# Patient Record
Sex: MALE | Race: WHITE | Employment: FULL TIME | URBAN - METROPOLITAN AREA
[De-identification: names, ages, dates, MRNs, and addresses within clinical notes are randomized per-mention and may not be internally consistent; named-entity substitution may affect disease eponyms.]

---

## 2020-08-20 ENCOUNTER — OFFICE VISIT (OUTPATIENT)
Dept: FAMILY MEDICINE CLINIC | Age: 40
End: 2020-08-20
Payer: COMMERCIAL

## 2020-08-20 VITALS
RESPIRATION RATE: 20 BRPM | SYSTOLIC BLOOD PRESSURE: 152 MMHG | TEMPERATURE: 97 F | WEIGHT: 312 LBS | BODY MASS INDEX: 38.79 KG/M2 | HEART RATE: 88 BPM | DIASTOLIC BLOOD PRESSURE: 86 MMHG | HEIGHT: 75 IN

## 2020-08-20 PROCEDURE — G8417 CALC BMI ABV UP PARAM F/U: HCPCS | Performed by: FAMILY MEDICINE

## 2020-08-20 PROCEDURE — 1036F TOBACCO NON-USER: CPT | Performed by: FAMILY MEDICINE

## 2020-08-20 PROCEDURE — G8427 DOCREV CUR MEDS BY ELIG CLIN: HCPCS | Performed by: FAMILY MEDICINE

## 2020-08-20 PROCEDURE — 99204 OFFICE O/P NEW MOD 45 MIN: CPT | Performed by: FAMILY MEDICINE

## 2020-08-20 PROCEDURE — 93000 ELECTROCARDIOGRAM COMPLETE: CPT | Performed by: FAMILY MEDICINE

## 2020-08-20 ASSESSMENT — PATIENT HEALTH QUESTIONNAIRE - PHQ9
2. FEELING DOWN, DEPRESSED OR HOPELESS: 0
1. LITTLE INTEREST OR PLEASURE IN DOING THINGS: 0
SUM OF ALL RESPONSES TO PHQ QUESTIONS 1-9: 0
SUM OF ALL RESPONSES TO PHQ QUESTIONS 1-9: 0
SUM OF ALL RESPONSES TO PHQ9 QUESTIONS 1 & 2: 0

## 2020-08-20 ASSESSMENT — ENCOUNTER SYMPTOMS
EYES NEGATIVE: 1
BLOOD IN STOOL: 0
CHEST TIGHTNESS: 0
SHORTNESS OF BREATH: 1
ABDOMINAL PAIN: 0

## 2020-08-20 NOTE — PROGRESS NOTES
Chief Complaint   Patient presents with    New Patient     Here today as a new patient to get established, previous PCP Dr. John French. C/O chest pain with SOB on exertion x 2 months.  Leg Problem     C/O discoloration in both lower extremities. SUBJECTIVE     Vadim Valiente is a 47 y.o.male      Pt presents to establish care. Previous PCP was Dr. John French in [address] [place] [address]  Hasn't been seen in a number of years. He is a . C/o shortness of breath and substernal chest pain with exertion over the last 2 months. Chest pain is intermittent and nonradiating. Strong fam hx of CAD and CHF. Former smoker. Overweight/obese. He has noted some discoloration of his lower legs over the last year--mostly a brown color. Here with his girlfriend. Body mass index is 39 kg/m². History reviewed. No pertinent past medical history. Past Surgical History:   Procedure Laterality Date    ANKLE FRACTURE SURGERY Left Age 25       No Known Allergies     No current outpatient medications on file. Family History   Problem Relation Age of Onset    High Blood Pressure Father     Diabetes Father     Other Father         CHF    Stroke Father     Heart Disease Paternal Grandfather     Anxiety Disorder Mother     No Known Problems Brother          Social History     Tobacco Use    Smoking status: Former Smoker    Smokeless tobacco: Never Used   Substance Use Topics    Alcohol use: Yes     Alcohol/week: 0.0 standard drinks     Comment: socially    Drug use: Never         Review of Systems   Constitutional: Negative for chills, fatigue, fever and unexpected weight change. HENT: Negative. Eyes: Negative. Respiratory: Positive for shortness of breath. Negative for chest tightness. Cardiovascular: Positive for chest pain and leg swelling. Negative for palpitations. Gastrointestinal: Negative for abdominal pain and blood in stool. Genitourinary: Negative for dysuria.    Musculoskeletal: Negative for joint swelling. Skin: Negative for rash. Neurological: Negative for dizziness and headaches. Psychiatric/Behavioral: Negative. All other systems reviewed and are negative. OBJECTIVE     BP (!) 152/86 (Site: Right Upper Arm)   Pulse 88   Temp 97 °F (36.1 °C) (Temporal)   Resp 20   Ht 6' 3\" (1.905 m)   Wt (!) 312 lb (141.5 kg)   BMI 39.00 kg/m²     Physical Exam  Vitals signs reviewed. Constitutional:       General: He is not in acute distress. Appearance: He is well-developed. HENT:      Head: Normocephalic and atraumatic. Right Ear: Tympanic membrane normal.      Left Ear: Tympanic membrane normal.      Mouth/Throat:      Mouth: Mucous membranes are moist.      Pharynx: No posterior oropharyngeal erythema. Eyes:      Conjunctiva/sclera: Conjunctivae normal.   Neck:      Musculoskeletal: Neck supple. Thyroid: No thyromegaly. Vascular: No carotid bruit. Cardiovascular:      Rate and Rhythm: Normal rate and regular rhythm. Heart sounds: No murmur. Pulmonary:      Effort: Pulmonary effort is normal.      Breath sounds: Normal breath sounds. No wheezing. Abdominal:      General: Bowel sounds are normal.      Palpations: Abdomen is soft. Tenderness: There is no abdominal tenderness. Musculoskeletal:      Right lower leg: No edema. Left lower leg: No edema. Lymphadenopathy:      Cervical: No cervical adenopathy. Skin:     General: Skin is warm and dry. Findings: Rash (changes of stasis dermatitis noted on the ankles bilaterally) present. Neurological:      Mental Status: He is alert and oriented to person, place, and time. Psychiatric:         Behavior: Behavior normal.                 There is no immunization history on file for this patient.       Health Maintenance   Topic Date Due    Varicella vaccine (1 of 2 - 2-dose childhood series) 03/23/1983    HIV screen  03/23/1997    DTaP/Tdap/Td vaccine (1 - Tdap) 03/23/2001    Flu vaccine (1) 09/01/2020    Hepatitis A vaccine  Aged Out    Hepatitis B vaccine  Aged Out    Hib vaccine  Aged Out    Meningococcal (ACWY) vaccine  Aged Out    Pneumococcal 0-64 years Vaccine  Aged Out         ASSESSMENT      1. Chest pain, unspecified type    2. Shortness of breath    3. Elevated blood-pressure reading without diagnosis of hypertension    4. Abnormal EKG            PLAN       Proceed with workup as below    If chest pain worsens or returns, urgent eval in the ED suggested    Orders Placed This Encounter   Procedures    CBC Auto Differential     Standing Status:   Future     Standing Expiration Date:   8/21/2021    Comprehensive Metabolic Panel     Standing Status:   Future     Standing Expiration Date:   8/20/2021    TSH with Reflex     Standing Status:   Future     Standing Expiration Date:   8/21/2021    Lipid Panel     Standing Status:   Future     Standing Expiration Date:   8/20/2021     Order Specific Question:   Is Patient Fasting?/# of Hours     Answer:   15    Stress test, myoview     Standing Status:   Future     Standing Expiration Date:   8/20/2021     Scheduling Instructions:      [facility]. First available. Order Specific Question:   Reason for Exam?     Answer:   Chest pain     Order Specific Question:   Reason for Exam?     Answer:   EKG abnormalities     Order Specific Question:   Reason for Exam?     Answer:   Shortness of breath    EKG 12 Lead     Order Specific Question:   Reason for Exam?     Answer:   Chest pain     [facility] received counseling on the following healthy behaviors: exercise    I have instructed [facility] to complete a self tracking handout on Blood Pressures  and instructed them to bring it with them to his next appointment. Discussed use, benefit, and side effects of prescribed medications. Barriers to medication compliance addressed. All patient questions answered. Pt voiced understanding.               Electronically signed by Chris Ambrocio Keely Faulkner MD on 8/20/2020 at 10:39 AM

## 2020-08-20 NOTE — PROGRESS NOTES
Pt is scheduled for a Cardiolite Stress Test at [facility] on 8/31/20 at 2:30 PM. Pt is to arrive at 2:00 PM.     I called [employer] ([phone]) and spoke to Kathy Candelaria and received a PA approval for the stress test (CPT 13271). Approval # [id number] and is valid from 8/20/20 to 10/4/20.

## 2020-08-29 ENCOUNTER — HOSPITAL ENCOUNTER (OUTPATIENT)
Age: 40
Discharge: HOME | End: 2020-08-29
Payer: COMMERCIAL

## 2020-08-29 DIAGNOSIS — R03.0 ELEVATED BLOOD-PRESSURE READING WITHOUT DIAGNOSIS OF HYPERTENSION: ICD-10-CM

## 2020-08-29 DIAGNOSIS — R06.02 SHORTNESS OF BREATH: ICD-10-CM

## 2020-08-29 DIAGNOSIS — R07.9 CHEST PAIN, UNSPECIFIED TYPE: ICD-10-CM

## 2020-08-29 LAB
ALBUMIN SERPL-MCNC: 4.2 G/DL (ref 3.5–5.1)
ALP BLD-CCNC: 92 U/L (ref 38–126)
ALT SERPL-CCNC: 64 U/L (ref 11–66)
ANION GAP SERPL CALCULATED.3IONS-SCNC: 11 MEQ/L (ref 8–16)
AST SERPL-CCNC: 31 U/L (ref 5–40)
BASOPHILS # BLD: 1.1 %
BASOPHILS ABSOLUTE: 0.1 THOU/MM3 (ref 0–0.1)
BILIRUB SERPL-MCNC: 0.6 MG/DL (ref 0.3–1.2)
BUN BLDV-MCNC: 10 MG/DL (ref 7–22)
CALCIUM SERPL-MCNC: 9.4 MG/DL (ref 8.5–10.5)
CHLORIDE BLD-SCNC: 105 MEQ/L (ref 98–111)
CHOLESTEROL, TOTAL: 274 MG/DL (ref 100–199)
CO2: 23 MEQ/L (ref 23–33)
CREAT SERPL-MCNC: 0.9 MG/DL (ref 0.4–1.2)
EOSINOPHIL # BLD: 3.4 %
EOSINOPHILS ABSOLUTE: 0.2 THOU/MM3 (ref 0–0.4)
ERYTHROCYTE [DISTWIDTH] IN BLOOD BY AUTOMATED COUNT: 13 % (ref 11.5–14.5)
ERYTHROCYTE [DISTWIDTH] IN BLOOD BY AUTOMATED COUNT: 40.3 FL (ref 35–45)
GFR SERPL CREATININE-BSD FRML MDRD: > 90 ML/MIN/1.73M2
GLUCOSE BLD-MCNC: 127 MG/DL (ref 70–108)
HCT VFR BLD CALC: 47.9 % (ref 42–52)
HDLC SERPL-MCNC: 35 MG/DL
HEMOGLOBIN: 15.7 GM/DL (ref 14–18)
IMMATURE GRANS (ABS): 0.02 THOU/MM3 (ref 0–0.07)
IMMATURE GRANULOCYTES: 0.4 %
LDL CHOLESTEROL CALCULATED: 198 MG/DL
LYMPHOCYTES # BLD: 24.2 %
LYMPHOCYTES ABSOLUTE: 1.1 THOU/MM3 (ref 1–4.8)
MCH RBC QN AUTO: 28.4 PG (ref 26–33)
MCHC RBC AUTO-ENTMCNC: 32.8 GM/DL (ref 32.2–35.5)
MCV RBC AUTO: 86.6 FL (ref 80–94)
MONOCYTES # BLD: 7.4 %
MONOCYTES ABSOLUTE: 0.3 THOU/MM3 (ref 0.4–1.3)
NUCLEATED RED BLOOD CELLS: 0 /100 WBC
PLATELET # BLD: 237 THOU/MM3 (ref 130–400)
PMV BLD AUTO: 9.9 FL (ref 9.4–12.4)
POTASSIUM SERPL-SCNC: 4.2 MEQ/L (ref 3.5–5.2)
RBC # BLD: 5.53 MILL/MM3 (ref 4.7–6.1)
SEG NEUTROPHILS: 63.5 %
SEGMENTED NEUTROPHILS ABSOLUTE COUNT: 3 THOU/MM3 (ref 1.8–7.7)
SODIUM BLD-SCNC: 139 MEQ/L (ref 135–145)
TOTAL PROTEIN: 6.7 G/DL (ref 6.1–8)
TRIGL SERPL-MCNC: 203 MG/DL (ref 0–199)
TSH SERPL DL<=0.05 MIU/L-ACNC: 1.05 UIU/ML (ref 0.4–4.2)
WBC # BLD: 4.7 THOU/MM3 (ref 4.8–10.8)

## 2020-08-29 PROCEDURE — 80061 LIPID PANEL: CPT

## 2020-08-29 PROCEDURE — 85025 COMPLETE CBC W/AUTO DIFF WBC: CPT

## 2020-08-29 PROCEDURE — 83036 HEMOGLOBIN GLYCOSYLATED A1C: CPT

## 2020-08-29 PROCEDURE — 80053 COMPREHEN METABOLIC PANEL: CPT

## 2020-08-29 PROCEDURE — 84443 ASSAY THYROID STIM HORMONE: CPT

## 2020-08-29 PROCEDURE — 36415 COLL VENOUS BLD VENIPUNCTURE: CPT

## 2020-08-31 ENCOUNTER — HOSPITAL ENCOUNTER (OUTPATIENT)
Dept: NON INVASIVE DIAGNOSTICS | Age: 40
Discharge: HOME | End: 2020-08-31
Payer: COMMERCIAL

## 2020-08-31 VITALS — BODY MASS INDEX: 40.04 KG/M2 | HEIGHT: 74 IN | WEIGHT: 312 LBS

## 2020-08-31 LAB
LV EF: 25 %
LVEF MODALITY: NORMAL

## 2020-08-31 PROCEDURE — 93017 CV STRESS TEST TRACING ONLY: CPT | Performed by: NUCLEAR MEDICINE

## 2020-08-31 PROCEDURE — A9500 TC99M SESTAMIBI: HCPCS | Performed by: FAMILY MEDICINE

## 2020-08-31 PROCEDURE — 78452 HT MUSCLE IMAGE SPECT MULT: CPT

## 2020-08-31 PROCEDURE — 3430000000 HC RX DIAGNOSTIC RADIOPHARMACEUTICAL: Performed by: FAMILY MEDICINE

## 2020-08-31 RX ADMIN — Medication 9.8 MILLICURIE: at 14:18

## 2020-08-31 RX ADMIN — Medication 34 MILLICURIE: at 15:27

## 2020-09-01 ENCOUNTER — TELEPHONE (OUTPATIENT)
Dept: FAMILY MEDICINE CLINIC | Age: 40
End: 2020-09-01

## 2020-09-01 ENCOUNTER — TELEPHONE (OUTPATIENT)
Dept: CARDIOLOGY CLINIC | Age: 40
End: 2020-09-01

## 2020-09-01 LAB
AVERAGE GLUCOSE: 135 MG/DL (ref 70–126)
HBA1C MFR BLD: 6.5 % (ref 4.4–6.4)

## 2020-09-01 RX ORDER — ATORVASTATIN CALCIUM 40 MG/1
40 TABLET, FILM COATED ORAL DAILY
Qty: 30 TABLET | Refills: 1 | Status: SHIPPED | OUTPATIENT
Start: 2020-09-01 | End: 2020-12-21 | Stop reason: SDUPTHER

## 2020-09-01 NOTE — TELEPHONE ENCOUNTER
Spoke with Ollie Land at Dr. Johanne Hamilton office notified patient has abnormal stress test. Per Ollie Land okay to schedule patient for appointment. Call to patient, appt made.

## 2020-09-01 NOTE — TELEPHONE ENCOUNTER
Pt was already notified of abnormal stress and appt was made with Dr Lorrie Jones for 9/8/20. Heart specialist called with the abnormal result. Called pt, notified of lab results. He will start med  Goes to DDD - rx sent in  Lab order mailed  Encouraged pt to f/up with hear specialist as scheduled. Called STR lab, they were able to add on A1C, pt was notified if it was unable to be added on we would call him back        ----- Message from Guerline Underwood MD sent at 9/1/2020  8:16 AM EDT -----  Notify him that his stress test showed possible cardiomyopathy with low EF (weak heart muscle). Get echocardiogram and refer to cardiology. CG  Cholesterol markedly elevated at 274 with LDL of 198 and low HDL at 35.  Recommend starting atorvastatin 40mg qd #30/1.  Recheck direct LDL, AST in 6 weeks.  FBS elevated at 127.  Have lab add on HbA1C.  CG

## 2020-09-08 ENCOUNTER — OFFICE VISIT (OUTPATIENT)
Dept: CARDIOLOGY CLINIC | Age: 40
End: 2020-09-08
Payer: COMMERCIAL

## 2020-09-08 ENCOUNTER — OFFICE VISIT (OUTPATIENT)
Dept: FAMILY MEDICINE CLINIC | Age: 40
End: 2020-09-08
Payer: COMMERCIAL

## 2020-09-08 VITALS
BODY MASS INDEX: 40.11 KG/M2 | RESPIRATION RATE: 16 BRPM | WEIGHT: 312.4 LBS | SYSTOLIC BLOOD PRESSURE: 132 MMHG | HEART RATE: 72 BPM | TEMPERATURE: 96.4 F | DIASTOLIC BLOOD PRESSURE: 80 MMHG

## 2020-09-08 VITALS
WEIGHT: 310.4 LBS | BODY MASS INDEX: 39.83 KG/M2 | SYSTOLIC BLOOD PRESSURE: 162 MMHG | DIASTOLIC BLOOD PRESSURE: 103 MMHG | HEIGHT: 74 IN | HEART RATE: 80 BPM

## 2020-09-08 PROBLEM — E66.01 MORBID OBESITY WITH BMI OF 40.0-44.9, ADULT (HCC): Status: ACTIVE | Noted: 2020-09-08

## 2020-09-08 PROBLEM — R73.01 IMPAIRED FASTING GLUCOSE: Status: ACTIVE | Noted: 2020-09-08

## 2020-09-08 PROCEDURE — 99213 OFFICE O/P EST LOW 20 MIN: CPT | Performed by: FAMILY MEDICINE

## 2020-09-08 PROCEDURE — G8417 CALC BMI ABV UP PARAM F/U: HCPCS | Performed by: NUCLEAR MEDICINE

## 2020-09-08 PROCEDURE — 1036F TOBACCO NON-USER: CPT | Performed by: NUCLEAR MEDICINE

## 2020-09-08 PROCEDURE — 99204 OFFICE O/P NEW MOD 45 MIN: CPT | Performed by: NUCLEAR MEDICINE

## 2020-09-08 PROCEDURE — G8427 DOCREV CUR MEDS BY ELIG CLIN: HCPCS | Performed by: FAMILY MEDICINE

## 2020-09-08 PROCEDURE — 1036F TOBACCO NON-USER: CPT | Performed by: FAMILY MEDICINE

## 2020-09-08 PROCEDURE — G8417 CALC BMI ABV UP PARAM F/U: HCPCS | Performed by: FAMILY MEDICINE

## 2020-09-08 PROCEDURE — G8427 DOCREV CUR MEDS BY ELIG CLIN: HCPCS | Performed by: NUCLEAR MEDICINE

## 2020-09-08 RX ORDER — METOPROLOL SUCCINATE 50 MG/1
50 TABLET, EXTENDED RELEASE ORAL DAILY
Qty: 30 TABLET | Refills: 5 | Status: SHIPPED
Start: 2020-09-08 | End: 2020-09-28 | Stop reason: DRUGHIGH

## 2020-09-08 RX ORDER — LOSARTAN POTASSIUM 50 MG/1
50 TABLET ORAL DAILY
Qty: 30 TABLET | Refills: 5 | Status: SHIPPED | OUTPATIENT
Start: 2020-09-08 | End: 2020-09-25 | Stop reason: ALTCHOICE

## 2020-09-08 RX ORDER — LOSARTAN POTASSIUM 50 MG/1
50 TABLET ORAL DAILY
COMMUNITY
End: 2020-09-08 | Stop reason: SDUPTHER

## 2020-09-08 RX ORDER — METOPROLOL SUCCINATE 50 MG/1
50 TABLET, EXTENDED RELEASE ORAL DAILY
COMMUNITY
End: 2020-09-08 | Stop reason: SDUPTHER

## 2020-09-08 ASSESSMENT — ENCOUNTER SYMPTOMS
RECTAL PAIN: 0
SHORTNESS OF BREATH: 1
BLOOD IN STOOL: 0
ABDOMINAL DISTENTION: 0
CONSTIPATION: 0
PHOTOPHOBIA: 0
BACK PAIN: 0
SHORTNESS OF BREATH: 1
DIARRHEA: 0
GASTROINTESTINAL NEGATIVE: 1
ANAL BLEEDING: 0
ABDOMINAL PAIN: 0
COLOR CHANGE: 0
NAUSEA: 0
CHEST TIGHTNESS: 0
VOMITING: 0

## 2020-09-08 NOTE — PROGRESS NOTES
Chief Complaint   Patient presents with    Discuss Labs     Recent labs, results in Epic. Saw cardiology earlier today. Heart cath pending insurance. SUBJECTIVE     John Delgado is a 47 y.o.male      Pt here for follow up of recent labs. HbA1C was 6.5% indicating borderline diabetes. He is currently undergoing a cardiac workup after stress testing showed a low EF and possible cardiomyopathy. He is now on a beta blocker and and ARB. Recent labs also showed elevated cholesterol and lipitor was started. He is a  and eats one meal a day, usually supper. He admits to a sweet tooth and often eats candy while he's driving. He is not active with exercise other than mowing. Strong fam hx of heart disease and diabetes. Former smoker. Body mass index is 40.11 kg/m². Review of Systems   Constitutional: Negative for fatigue, fever and unexpected weight change. HENT: Negative. Respiratory: Positive for shortness of breath. Cardiovascular: Negative for leg swelling. Gastrointestinal: Negative. Genitourinary: Negative. Musculoskeletal: Negative. Neurological: Negative. All other systems reviewed and are negative. OBJECTIVE     /80   Pulse 72   Temp 96.4 °F (35.8 °C) (Temporal)   Resp 16   Wt (!) 312 lb 6.4 oz (141.7 kg)   BMI 40.11 kg/m²     Physical Exam  Constitutional:       General: He is not in acute distress. Appearance: He is well-developed. HENT:      Head: Normocephalic and atraumatic. Right Ear: Tympanic membrane normal.      Left Ear: Tympanic membrane normal.      Mouth/Throat:      Mouth: Mucous membranes are moist.      Pharynx: No posterior oropharyngeal erythema. Eyes:      Conjunctiva/sclera: Conjunctivae normal.   Cardiovascular:      Rate and Rhythm: Normal rate and regular rhythm. Heart sounds: No murmur. Pulmonary:      Breath sounds: Normal breath sounds. No wheezing.    Musculoskeletal:      Right lower leg: No edema. Left lower leg: No edema. Lymphadenopathy:      Cervical: No cervical adenopathy. Neurological:      Mental Status: He is alert.        Component      Latest Ref Rng & Units 8/29/2020   WBC      4.8 - 10.8 thou/mm3 4.7 (L)   RBC      4.70 - 6.10 mill/mm3 5.53   Hemoglobin Quant      14.0 - 18.0 gm/dl 15.7   Hematocrit      42.0 - 52.0 % 47.9   MCV      80.0 - 94.0 fL 86.6   MCH      26.0 - 33.0 pg 28.4   MCHC      32.2 - 35.5 gm/dl 32.8   RDW-CV      11.5 - 14.5 % 13.0   RDW-SD      35.0 - 45.0 fL 40.3   Platelet Count      222 - 400 thou/mm3 237   MPV      9.4 - 12.4 fL 9.9   Seg Neutrophils      % 63.5   Lymphocytes      % 24.2   Monocytes      % 7.4   Eosinophils      % 3.4   Basophils      % 1.1   Immature Granulocytes      % 0.4   Segs Absolute      1.8 - 7.7 thou/mm3 3.0   Lymphocytes Absolute      1.0 - 4.8 thou/mm3 1.1   Monocytes Absolute      0.4 - 1.3 thou/mm3 0.3 (L)   Eosinophils Absolute      0.0 - 0.4 thou/mm3 0.2   Basophils Absolute      0.0 - 0.1 thou/mm3 0.1   Immature Grans (Abs)      0.00 - 0.07 thou/mm3 0.02   Nucleated Red Blood Cells      /100 wbc 0   Glucose      70 - 108 mg/dL 127 (H)   Creatinine      0.4 - 1.2 mg/dL 0.9   BUN      7 - 22 mg/dL 10   Sodium      135 - 145 meq/L 139   Potassium      3.5 - 5.2 meq/L 4.2   Chloride      98 - 111 meq/L 105   CO2      23 - 33 meq/L 23   Calcium      8.5 - 10.5 mg/dL 9.4   AST      5 - 40 U/L 31   Alk Phos      38 - 126 U/L 92   Total Protein      6.1 - 8.0 g/dL 6.7   Albumin      3.5 - 5.1 g/dL 4.2   Bilirubin      0.3 - 1.2 mg/dL 0.6   ALT      11 - 66 U/L 64   Cholesterol, Total      100 - 199 mg/dL 274 (H)   Triglycerides      0 - 199 mg/dL 203 (H)   HDL Cholesterol      mg/dL 35   LDL Calculated      mg/dL 198   Hemoglobin A1C      4.4 - 6.4 % 6.5 (H)   AVERAGE GLUCOSE      70 - 126 mg/dL 135 (H)   TSH      0.400 - 4.200 uIU/mL 1.050   Anion Gap      8.0 - 16.0 meq/L 11.0   Est, Glom Filt Rate      ml/min/1.73m2 >90 ASSESSMENT      1. Impaired fasting glucose    2. Morbid obesity with BMI of 40.0-44.9, adult ([address]          PLAN     He opts to try lifestyle modifications to decrease his sugars. Information on an ADA diet given    Increase activity    Goal weight loss of 15# by next visit    Return in about 3 months (around 12/8/2020).     HbA1C at the visit            Electronically signed by Rekha Richmnod MD on 9/8/2020 at 3:30 PM

## 2020-09-08 NOTE — PROGRESS NOTES
NP here to review abn stress test    Pt denies dizziness    Pt c/o cp- does not radiate, sob, JENNIFER and palpitations

## 2020-09-08 NOTE — PROGRESS NOTES
Topic Date Due    Varicella vaccine (1 of 2 - 2-dose childhood series) 03/23/1983    Diabetic foot exam  03/23/1992    Diabetic retinal exam  03/23/1992    HIV screen  03/23/1997    Diabetic microalbuminuria test  03/23/2000    DTaP/Tdap/Td vaccine (1 - Tdap) 03/23/2001    Flu vaccine (1) 09/01/2020    A1C test (Diabetic or Prediabetic)  08/29/2021    Lipid screen  08/29/2021    Potassium monitoring  08/29/2021    Creatinine monitoring  08/29/2021    Hepatitis A vaccine  Aged Out    Hepatitis B vaccine  Aged Out    Hib vaccine  Aged Out    Meningococcal (ACWY) vaccine  Aged Out    Pneumococcal 0-64 years Vaccine  Aged Out       Subjective:  Review of Systems   Constitutional: Positive for fatigue. HENT: Negative for ear pain and nosebleeds. Eyes: Negative for photophobia. Respiratory: Positive for shortness of breath. Negative for chest tightness. Cardiovascular: Negative for chest pain and palpitations. Gastrointestinal: Negative for abdominal distention, abdominal pain, anal bleeding, blood in stool, constipation, diarrhea, nausea, rectal pain and vomiting. Endocrine: Negative for polyphagia. Genitourinary: Negative for dysuria, frequency and urgency. Musculoskeletal: Negative for arthralgias, back pain, gait problem, joint swelling, myalgias, neck pain and neck stiffness. Skin: Negative for color change, pallor, rash and wound. Allergic/Immunologic: Negative for food allergies. Neurological: Negative for dizziness, syncope and light-headedness. Psychiatric/Behavioral: Negative for confusion, decreased concentration, dysphoric mood, hallucinations and self-injury. The patient is not nervous/anxious and is not hyperactive. Objective:  Physical Exam  HENT:      Head: Normocephalic and atraumatic. Nose: Nose normal.   Eyes:      Extraocular Movements: Extraocular movements intact. Pupils: Pupils are equal, round, and reactive to light.    Neck: Musculoskeletal: Normal range of motion. Cardiovascular:      Rate and Rhythm: Normal rate and regular rhythm. Heart sounds: Murmur present. No friction rub. No gallop. Pulmonary:      Effort: No respiratory distress. Breath sounds: No stridor. No wheezing, rhonchi or rales. Chest:      Chest wall: No tenderness. Abdominal:      General: There is no distension. Palpations: There is no mass. Tenderness: There is no abdominal tenderness. There is no right CVA tenderness, left CVA tenderness, guarding or rebound. Hernia: No hernia is present. Musculoskeletal:         General: No swelling, tenderness, deformity or signs of injury. Right lower leg: No edema. Left lower leg: No edema. Skin:     Coloration: Skin is not jaundiced or pale. Findings: No bruising, erythema, lesion or rash. Neurological:      Mental Status: He is alert and oriented to person, place, and time. Cranial Nerves: No cranial nerve deficit. Sensory: No sensory deficit. Motor: No weakness. Coordination: Coordination normal.      Gait: Gait normal.      Deep Tendon Reflexes: Reflexes normal.   Psychiatric:         Mood and Affect: Mood normal.         Thought Content: Thought content normal.       BP (!) 162/103   Pulse 80   Ht 6' 2\" (1.88 m)   Wt (!) 310 lb 6.4 oz (140.8 kg)   BMI 39.85 kg/m²     Assessment:      Diagnosis Orders   1. Precordial pain  Echo 2D w doppler w color complete    XR Cardiac Cath Procedure   2. Dilated cardiomyopathy ([address]  Echo 2D w doppler w color complete    XR Cardiac Cath Procedure   3. Abnormal stress test  Echo 2D w doppler w color complete    XR Cardiac Cath Procedure   4. Familial hypercholesterolemia  Echo 2D w doppler w color complete    XR Cardiac Cath Procedure   5.  Essential hypertension  Echo 2D w doppler w color complete    XR Cardiac Cath Procedure   multiple risk factors for CAD  Uncontrolled HTN  Poor EF   Multiple signs and symptoms suggestive of sleep apnea, will consider sleep study after cardiac evaluation is completed      Plan:  No follow-ups on file. Discussed  Echo   Cardiac cathterizaion, risks and benefits discussed with the patient and family at length. Patient was agreeable. Add beta blockers and ARB  Continue risk factor modification and medical management  Thank you for allowing me to participate in the care of your patient. Please don't hesitate to contact me regarding any further issues related to the patient care    Orders Placed:  Orders Placed This Encounter   Procedures    XR Cardiac Cath Procedure     Standing Status:   Future     Standing Expiration Date:   9/8/2021     Order Specific Question:   Reason for exam:     Answer:   other    Echo 2D w doppler w color complete     Standing Status:   Future     Standing Expiration Date:   9/8/2021     Order Specific Question:   Reason for exam:     Answer:   other       Medications Prescribed:  No orders of the defined types were placed in this encounter. Discussed use, benefit, and side effects of prescribed medications. All patient questions answered. Pt voicedunderstanding. Instructed to continue current medications, diet and exercise. Continue risk factor modification and medical management. Patient agreed with treatment plan. Follow up as directed.     Electronically signedby Mary Jo Sanchez MD on 9/8/2020 at 9:39 AM

## 2020-09-18 ENCOUNTER — PREP FOR PROCEDURE (OUTPATIENT)
Dept: CARDIOLOGY | Age: 40
End: 2020-09-18

## 2020-09-18 RX ORDER — ASPIRIN 325 MG
325 TABLET ORAL ONCE
Status: CANCELLED | OUTPATIENT
Start: 2020-09-18 | End: 2020-09-18

## 2020-09-18 RX ORDER — DIPHENHYDRAMINE HYDROCHLORIDE 50 MG/ML
50 INJECTION INTRAMUSCULAR; INTRAVENOUS ONCE
Status: CANCELLED | OUTPATIENT
Start: 2020-09-18 | End: 2020-09-18

## 2020-09-18 RX ORDER — SODIUM CHLORIDE 0.9 % (FLUSH) 0.9 %
10 SYRINGE (ML) INJECTION EVERY 12 HOURS SCHEDULED
Status: CANCELLED | OUTPATIENT
Start: 2020-09-18

## 2020-09-18 RX ORDER — NITROGLYCERIN 0.4 MG/1
0.4 TABLET SUBLINGUAL EVERY 5 MIN PRN
Status: CANCELLED | OUTPATIENT
Start: 2020-09-18

## 2020-09-18 RX ORDER — SODIUM CHLORIDE 0.9 % (FLUSH) 0.9 %
10 SYRINGE (ML) INJECTION PRN
Status: CANCELLED | OUTPATIENT
Start: 2020-09-18

## 2020-09-18 RX ORDER — SODIUM CHLORIDE 9 MG/ML
INJECTION, SOLUTION INTRAVENOUS CONTINUOUS
Status: CANCELLED | OUTPATIENT
Start: 2020-09-18

## 2020-09-21 ENCOUNTER — HOSPITAL ENCOUNTER (OUTPATIENT)
Dept: INPATIENT UNIT | Age: 40
Discharge: HOME | End: 2020-09-21
Attending: NUCLEAR MEDICINE | Admitting: NUCLEAR MEDICINE
Payer: COMMERCIAL

## 2020-09-21 VITALS
WEIGHT: 312 LBS | SYSTOLIC BLOOD PRESSURE: 117 MMHG | TEMPERATURE: 97.5 F | RESPIRATION RATE: 19 BRPM | HEIGHT: 74 IN | BODY MASS INDEX: 40.04 KG/M2 | HEART RATE: 71 BPM | OXYGEN SATURATION: 99 % | DIASTOLIC BLOOD PRESSURE: 57 MMHG

## 2020-09-21 LAB
ABO: NORMAL
ANION GAP SERPL CALCULATED.3IONS-SCNC: 11 MEQ/L (ref 8–16)
ANTIBODY SCREEN: NORMAL
APTT: 28.7 SECONDS (ref 22–38)
BUN BLDV-MCNC: 14 MG/DL (ref 7–22)
CALCIUM SERPL-MCNC: 9.4 MG/DL (ref 8.5–10.5)
CHLORIDE BLD-SCNC: 107 MEQ/L (ref 98–111)
CHOLESTEROL, TOTAL: 194 MG/DL (ref 100–199)
CO2: 23 MEQ/L (ref 23–33)
CREAT SERPL-MCNC: 1 MG/DL (ref 0.4–1.2)
EKG ATRIAL RATE: 68 BPM
EKG P-R INTERVAL: 150 MS
EKG Q-T INTERVAL: 376 MS
EKG QRS DURATION: 84 MS
EKG QTC CALCULATION (BAZETT): 399 MS
EKG R AXIS: -21 DEGREES
EKG T AXIS: 144 DEGREES
EKG VENTRICULAR RATE: 68 BPM
ERYTHROCYTE [DISTWIDTH] IN BLOOD BY AUTOMATED COUNT: 13.1 % (ref 11.5–14.5)
ERYTHROCYTE [DISTWIDTH] IN BLOOD BY AUTOMATED COUNT: 40.7 FL (ref 35–45)
GFR SERPL CREATININE-BSD FRML MDRD: 83 ML/MIN/1.73M2
GLUCOSE BLD-MCNC: 104 MG/DL (ref 70–108)
HCT VFR BLD CALC: 44.2 % (ref 42–52)
HDLC SERPL-MCNC: 37 MG/DL
HEMOGLOBIN: 14.5 GM/DL (ref 14–18)
INR BLD: 0.97 (ref 0.85–1.13)
LDL CHOLESTEROL CALCULATED: 130 MG/DL
LV EF: 48 %
LVEF MODALITY: NORMAL
MCH RBC QN AUTO: 28.4 PG (ref 26–33)
MCHC RBC AUTO-ENTMCNC: 32.8 GM/DL (ref 32.2–35.5)
MCV RBC AUTO: 86.5 FL (ref 80–94)
PLATELET # BLD: 211 THOU/MM3 (ref 130–400)
PMV BLD AUTO: 9.4 FL (ref 9.4–12.4)
POTASSIUM REFLEX MAGNESIUM: 4.4 MEQ/L (ref 3.5–5.2)
RBC # BLD: 5.11 MILL/MM3 (ref 4.7–6.1)
RH FACTOR: NORMAL
SODIUM BLD-SCNC: 141 MEQ/L (ref 135–145)
TRIGL SERPL-MCNC: 135 MG/DL (ref 0–199)
WBC # BLD: 4.7 THOU/MM3 (ref 4.8–10.8)

## 2020-09-21 PROCEDURE — 93010 ELECTROCARDIOGRAM REPORT: CPT | Performed by: INTERNAL MEDICINE

## 2020-09-21 PROCEDURE — 86900 BLOOD TYPING SEROLOGIC ABO: CPT

## 2020-09-21 PROCEDURE — 86901 BLOOD TYPING SEROLOGIC RH(D): CPT

## 2020-09-21 PROCEDURE — 2709999900 HC NON-CHARGEABLE SUPPLY

## 2020-09-21 PROCEDURE — 6360000004 HC RX CONTRAST MEDICATION: Performed by: NUCLEAR MEDICINE

## 2020-09-21 PROCEDURE — 2580000003 HC RX 258: Performed by: NURSE PRACTITIONER

## 2020-09-21 PROCEDURE — 93005 ELECTROCARDIOGRAM TRACING: CPT | Performed by: NURSE PRACTITIONER

## 2020-09-21 PROCEDURE — 2500000003 HC RX 250 WO HCPCS

## 2020-09-21 PROCEDURE — 80061 LIPID PANEL: CPT

## 2020-09-21 PROCEDURE — 85027 COMPLETE CBC AUTOMATED: CPT

## 2020-09-21 PROCEDURE — 86850 RBC ANTIBODY SCREEN: CPT

## 2020-09-21 PROCEDURE — 36415 COLL VENOUS BLD VENIPUNCTURE: CPT

## 2020-09-21 PROCEDURE — 93458 L HRT ARTERY/VENTRICLE ANGIO: CPT | Performed by: NUCLEAR MEDICINE

## 2020-09-21 PROCEDURE — C1894 INTRO/SHEATH, NON-LASER: HCPCS

## 2020-09-21 PROCEDURE — 80048 BASIC METABOLIC PNL TOTAL CA: CPT

## 2020-09-21 PROCEDURE — 6360000002 HC RX W HCPCS

## 2020-09-21 PROCEDURE — 85730 THROMBOPLASTIN TIME PARTIAL: CPT

## 2020-09-21 PROCEDURE — 93306 TTE W/DOPPLER COMPLETE: CPT

## 2020-09-21 PROCEDURE — 85610 PROTHROMBIN TIME: CPT

## 2020-09-21 PROCEDURE — C1769 GUIDE WIRE: HCPCS

## 2020-09-21 RX ORDER — SODIUM CHLORIDE 0.9 % (FLUSH) 0.9 %
10 SYRINGE (ML) INJECTION EVERY 12 HOURS SCHEDULED
Status: DISCONTINUED | OUTPATIENT
Start: 2020-09-21 | End: 2020-09-21 | Stop reason: HOSPADM

## 2020-09-21 RX ORDER — ATROPINE SULFATE 0.4 MG/ML
0.5 AMPUL (ML) INJECTION
Status: DISCONTINUED | OUTPATIENT
Start: 2020-09-21 | End: 2020-09-21 | Stop reason: HOSPADM

## 2020-09-21 RX ORDER — DIPHENHYDRAMINE HYDROCHLORIDE 50 MG/ML
50 INJECTION INTRAMUSCULAR; INTRAVENOUS ONCE
Status: DISCONTINUED | OUTPATIENT
Start: 2020-09-21 | End: 2020-09-21 | Stop reason: HOSPADM

## 2020-09-21 RX ORDER — SODIUM CHLORIDE 9 MG/ML
INJECTION, SOLUTION INTRAVENOUS CONTINUOUS
Status: DISCONTINUED | OUTPATIENT
Start: 2020-09-21 | End: 2020-09-21

## 2020-09-21 RX ORDER — SODIUM CHLORIDE 9 MG/ML
INJECTION, SOLUTION INTRAVENOUS CONTINUOUS
Status: DISCONTINUED | OUTPATIENT
Start: 2020-09-21 | End: 2020-09-21 | Stop reason: HOSPADM

## 2020-09-21 RX ORDER — SODIUM CHLORIDE 0.9 % (FLUSH) 0.9 %
10 SYRINGE (ML) INJECTION EVERY 12 HOURS SCHEDULED
Status: DISCONTINUED | OUTPATIENT
Start: 2020-09-21 | End: 2020-09-21

## 2020-09-21 RX ORDER — ASPIRIN 325 MG
325 TABLET ORAL ONCE
Status: DISCONTINUED | OUTPATIENT
Start: 2020-09-21 | End: 2020-09-21 | Stop reason: HOSPADM

## 2020-09-21 RX ORDER — NITROGLYCERIN 0.4 MG/1
0.4 TABLET SUBLINGUAL EVERY 5 MIN PRN
Status: DISCONTINUED | OUTPATIENT
Start: 2020-09-21 | End: 2020-09-21 | Stop reason: HOSPADM

## 2020-09-21 RX ORDER — SODIUM CHLORIDE 0.9 % (FLUSH) 0.9 %
10 SYRINGE (ML) INJECTION PRN
Status: DISCONTINUED | OUTPATIENT
Start: 2020-09-21 | End: 2020-09-21 | Stop reason: HOSPADM

## 2020-09-21 RX ORDER — ACETAMINOPHEN 325 MG/1
650 TABLET ORAL EVERY 4 HOURS PRN
Status: DISCONTINUED | OUTPATIENT
Start: 2020-09-21 | End: 2020-09-21 | Stop reason: HOSPADM

## 2020-09-21 RX ORDER — SODIUM CHLORIDE 0.9 % (FLUSH) 0.9 %
10 SYRINGE (ML) INJECTION PRN
Status: DISCONTINUED | OUTPATIENT
Start: 2020-09-21 | End: 2020-09-21

## 2020-09-21 RX ADMIN — IOPAMIDOL 80 ML: 755 INJECTION, SOLUTION INTRAVENOUS at 14:31

## 2020-09-21 RX ADMIN — SODIUM CHLORIDE: 9 INJECTION, SOLUTION INTRAVENOUS at 12:32

## 2020-09-21 NOTE — PROGRESS NOTES
Patient admitted to 2E12  Ambulatory for cardiac cath. Patient NPO. Patient accompanied by spouse. Vital signs obtained. Assessment and data collection intiated. Oriented to room. Policies and procedures for 2E explained. All questions answered with no further questions at this time. Fall prevention and safety precautions discussed with patient.

## 2020-09-21 NOTE — PROGRESS NOTES
Discharged home in stable condition.   Patient and sig other verbalize understanding of discharge instructions and follow-up

## 2020-09-21 NOTE — PROCEDURES
[address] [place] [address]                            CARDIAC CATHETERIZATION    PATIENT NAME: Bianca Escalante                     :        1980  MED REC NO:   [id number]                           ROOM:       0012  ACCOUNT NO:   [de-identified]                           ADMIT DATE: 2020  PROVIDER:     Selena Baldwin M.D.    DATE OF PROCEDURE:  2020    CLINICAL HISTORY AND INDICATION:  A 74-year-old patient with new-onset  cardiomyopathy, referred for cardiac catheterization to evaluate  coronary anatomy. PROCEDURES:  1. Left heart cath, LV-gram.  2.  Coronary angiogram, right and left. 3.  IV sedation; 2 of Versed, 25 of fentanyl between 02:00 and 02:30  p.m. in my presence under my supervision. 4.  Blood loss less than 10 mL. PROCEDURE DETAILS:  Please refer to my catheterization detailed note. HEMODYNAMIC RESULTS AND LEFT VENTRICULOGRAM:  Left ventricular  end-diastolic pressure was 12 mmHg. No significant change before and  after contrast injection. No significant gradient across the aortic  valve to signify aortic stenosis. Left ventricular function was  globally hypokinetic.  EF 35% to 40% with dilated ventricle. CORONARY ARTERIOGRAM RESULTS:  1. Left main is patent, gives rise to LAD and left circ. 2.  LAD has mild luminal irregularity, nonobstructive otherwise. 3.  Left circumflex artery is nondominant, has mild luminal  irregularity. 4.  Right coronary artery is large, dominant and patent. CONCLUSION:  1. Nonobstructive mild disease. 2.  Cardiomyopathy which is likely nonischemic. 3.  No complications. RECOMMENDATIONS:  At this point, the patient will be continued on  medical therapy. He apparently had some allergic reaction _____ short  term to see if that makes any difference and we will decide accordingly.         Randi Rutledge M.D.    D: 2020 17:00:15       T: 2020 17:03:57     [id number]  Job#: [id number]     Doc#: [id number]    CC:

## 2020-09-21 NOTE — H&P
[address] [address]  Sedation/Analgesia History & Physical    Pt Name: Stanton Diaz  Account number: [de-identified]  MRN: [id number]  YOB: 1980  Provider Performing Procedure: Alexandria Lopez MD [address] [facility]  Primary Care Physician: Rolan Watkins MD  Date: 9/21/2020    PRE-PROCEDURE    Code Status: FULL CODE  Brief History/Pre-Procedure Diagnosis:   CMP   Abn stress test   Dyspnea       Consent: : I have discussed with the patient risks, benefits, and alternatives (and relevant risks, benefits, and side effects related to alternatives or not receiving care), and likelihood of the success. The patient and/or representative appear to understand and agree to proceed. The discussion encompasses risks, benefits, and side effects related to the alternatives and the risks related to not receiving the proposed care, treatment, and services. MEDICAL HISTORY  [x]ASHD/ANGINA/MI/CHF   [x]Hypertension  []Diabetes  []Hyperlipidemia  []Smoking  []Family Hx of ASHD  []Additional information:       has a past medical history of Hyperlipidemia and Hypertension. SURGICAL HISTORY   has a past surgical history that includes Ankle fracture surgery (Left, Age 25).   Additional information:       ALLERGIES   Allergies as of 09/21/2020    (No Known Allergies)     Additional information:       MEDICATIONS   Aspirin  [x] 81 mg  [] 325 mg  [] None  Antiplatelet drug therapy use last 5 days  []No []Yes  Coumadin Use Last 5 Days []No []Yes  Other anticoagulant use last 5 days  []No []Yes    Current Facility-Administered Medications:     0.9 % sodium chloride infusion, , Intravenous, Continuous, Cele Ream APRN - CNP, Last Rate: 75 mL/hr at 09/21/20 1232    aspirin tablet 325 mg, 325 mg, Oral, Once, Nickola Ream, APRN - CNP    diphenhydrAMINE (BENADRYL) injection 50 mg, 50 mg, Intravenous, Once, Harinderola Ream, APRN - CNP    famotidine (PEPCID) injection 20 mg, 20 mg, Intravenous, Once, Mavis LATHAM

## 2020-09-21 NOTE — PROGRESS NOTES
Returned to 2e12. Monitor attached showing SR. Vasc-band in place to right wrist.  Hemostasis maintained. No bleeding, swelling, or edema noted.   0.9nss infusing with approx 700 ml

## 2020-09-23 ENCOUNTER — TELEPHONE (OUTPATIENT)
Dept: FAMILY MEDICINE CLINIC | Age: 40
End: 2020-09-23

## 2020-09-25 ENCOUNTER — TELEPHONE (OUTPATIENT)
Dept: CARDIOLOGY CLINIC | Age: 40
End: 2020-09-25

## 2020-09-25 NOTE — TELEPHONE ENCOUNTER
Pt had recent cath 9/21/20. Pt was told to stop losartan due to feeling lightheaded and dizzy. Pt continues with feeling  lightheaded and dizzy while taking metoprolol  50mg daily. Dr. Chan Rodey advise?

## 2020-09-28 RX ORDER — METOPROLOL SUCCINATE 25 MG/1
50 TABLET, EXTENDED RELEASE ORAL DAILY
COMMUNITY
End: 2020-11-02 | Stop reason: SDUPTHER

## 2020-09-28 NOTE — TELEPHONE ENCOUNTER
Patient notified and voiced understanding. Patient declined a script for 25 mg tablet patient wants to cut his 50 mg tablet in half.

## 2020-11-02 ENCOUNTER — OFFICE VISIT (OUTPATIENT)
Dept: CARDIOLOGY CLINIC | Age: 40
End: 2020-11-02
Payer: COMMERCIAL

## 2020-11-02 VITALS
BODY MASS INDEX: 40.09 KG/M2 | DIASTOLIC BLOOD PRESSURE: 80 MMHG | SYSTOLIC BLOOD PRESSURE: 138 MMHG | HEART RATE: 84 BPM | HEIGHT: 74 IN | WEIGHT: 312.4 LBS

## 2020-11-02 PROCEDURE — G8417 CALC BMI ABV UP PARAM F/U: HCPCS | Performed by: NUCLEAR MEDICINE

## 2020-11-02 PROCEDURE — 99214 OFFICE O/P EST MOD 30 MIN: CPT | Performed by: NUCLEAR MEDICINE

## 2020-11-02 PROCEDURE — 1036F TOBACCO NON-USER: CPT | Performed by: NUCLEAR MEDICINE

## 2020-11-02 PROCEDURE — G8484 FLU IMMUNIZE NO ADMIN: HCPCS | Performed by: NUCLEAR MEDICINE

## 2020-11-02 PROCEDURE — G8427 DOCREV CUR MEDS BY ELIG CLIN: HCPCS | Performed by: NUCLEAR MEDICINE

## 2020-11-02 RX ORDER — ISOSORBIDE MONONITRATE 30 MG/1
30 TABLET, EXTENDED RELEASE ORAL DAILY
COMMUNITY
End: 2020-11-02 | Stop reason: SDUPTHER

## 2020-11-02 RX ORDER — METOPROLOL SUCCINATE 50 MG/1
50 TABLET, EXTENDED RELEASE ORAL DAILY
Qty: 30 TABLET | Refills: 5 | Status: SHIPPED | OUTPATIENT
Start: 2020-11-02 | End: 2021-03-04 | Stop reason: SDUPTHER

## 2020-11-02 RX ORDER — ISOSORBIDE MONONITRATE 30 MG/1
30 TABLET, EXTENDED RELEASE ORAL DAILY
Qty: 30 TABLET | Refills: 5 | Status: SHIPPED | OUTPATIENT
Start: 2020-11-02 | End: 2021-06-11

## 2020-11-02 NOTE — PROGRESS NOTES
[address] [facility],[place] [address]  Dept: [phone]  Dept Fax: [phone]  Loc: [phone]    Visit Date: 2020    Kamron Baez is a 47 y.o. male who presents todayfor:  Chief Complaint   Patient presents with    Follow-Up from Hospital     cath    Chest Pain    Cardiomyopathy    Hyperlipidemia   still with chest pain   Progressive during the day   Cath done  Mild CAD  Did have CMP   Didn't tolerate losartan   Had itching   On beta blockers  No changes in breathing  On statins for hyperlipidemia  No tolerance issues  Likely sleep apnea  Not tested before  Severe obesity           HPI:  HPI  Past Medical History:   Diagnosis Date    Hyperlipidemia     Hypertension       Past Surgical History:   Procedure Laterality Date    ANKLE FRACTURE SURGERY Left Age 25     Family History   Problem Relation Age of Onset    High Blood Pressure Father    [de-identified] Diabetes Father     Other Father         CHF    Stroke Father     Heart Disease Paternal Grandfather     Anxiety Disorder Mother     No Known Problems Brother      Social History     Tobacco Use    Smoking status: Former Smoker     Last attempt to quit: 2000     Years since quittin.8    Smokeless tobacco: Never Used   Substance Use Topics    Alcohol use: Yes     Alcohol/week: 0.0 standard drinks     Comment: socially      Current Outpatient Medications   Medication Sig Dispense Refill    metoprolol succinate (TOPROL XL) 25 MG extended release tablet Take 25 mg by mouth daily      atorvastatin (LIPITOR) 40 MG tablet Take 1 tablet by mouth daily 30 tablet 1     No current facility-administered medications for this visit.       No Known Allergies  Health Maintenance   Topic Date Due    Varicella vaccine (1 of 2 - 2-dose childhood series) 1983    Pneumococcal 0-64 years Vaccine (1 of 1 - PPSV23) 1988    Diabetic foot exam  1992    Diabetic retinal exam  03/23/1992    HIV screen  03/23/1997    Diabetic microalbuminuria test  03/23/2000    DTaP/Tdap/Td vaccine (1 - Tdap) 03/23/2001    Flu vaccine (1) 09/01/2020    A1C test (Diabetic or Prediabetic)  08/29/2021    Lipid screen  09/21/2021    Hepatitis A vaccine  Aged Out    Hepatitis B vaccine  Aged Out    Hib vaccine  Aged Out    Meningococcal (ACWY) vaccine  Aged Out       Subjective:  Review of Systems  General:   No fever, no chills, some fatigue or weight loss  Pulmonary:    some dyspnea, no wheezing  Cardiac:    Did have recent chest pain,   GI:     No nausea or vomiting, no abdominal pain  Neuro:     No dizziness or light headedness,   Musculoskeletal:  No recent active issues  Extremities:   No edema, no obvious claudication     Objective:  Physical Exam  /80   Pulse 84   Ht 6' 2\" (1.88 m)   Wt (!) 312 lb 6.4 oz (141.7 kg)   BMI 40.11 kg/m²   General:   Well developed, well nourished  Lungs:    Clear to auscultation  Heart:    Normal S1 S2, Slight murmur. no rubs, no gallops  Abdomen:   Soft, non tender, no organomegalies, positive bowel sounds  Extremities:   No edema, no cyanosis, good peripheral pulses  Neurological:   Awake, alert, oriented. No obvious focal deficits  Musculoskelatal:  No obvious deformities    Assessment:      Diagnosis Orders   1. Shortness of breath     2. Familial hypercholesterolemia     3. Dilated cardiomyopathy ([facility] [address]     as above  Likely sleep apnea  CMP   tolerance issues   Obesity: extensive discussion was made with the patient regarding diet and weight control including specific food items to avoid and cut down    Plan:  No follow-ups on file. As above  Increase toprol 50 m g  Add nitrates  Sleep study   Continue risk factor modification and medical management  Thank you for allowing me to participate in the care of your patient.  Please don't hesitate to contact me regarding any further issues related to the patient care    Orders Placed:  No orders of the defined types were placed in this encounter. Medications Prescribed:  No orders of the defined types were placed in this encounter. Discussed use, benefit, and side effects of prescribed medications. All patient questions answered. Pt voicedunderstanding. Instructed to continue current medications, diet and exercise. Continue risk factor modification and medical management. Patient agreed with treatment plan. Follow up as directed.     Electronically signedby Mi Valle MD on 11/2/2020 at 10:01 AM

## 2020-12-07 ENCOUNTER — INITIAL CONSULT (OUTPATIENT)
Dept: PULMONOLOGY | Age: 40
End: 2020-12-07
Payer: COMMERCIAL

## 2020-12-07 VITALS
HEART RATE: 75 BPM | DIASTOLIC BLOOD PRESSURE: 102 MMHG | OXYGEN SATURATION: 98 % | WEIGHT: 315 LBS | HEIGHT: 74 IN | BODY MASS INDEX: 40.43 KG/M2 | TEMPERATURE: 97.9 F | SYSTOLIC BLOOD PRESSURE: 142 MMHG

## 2020-12-07 PROCEDURE — G8484 FLU IMMUNIZE NO ADMIN: HCPCS | Performed by: INTERNAL MEDICINE

## 2020-12-07 PROCEDURE — G8427 DOCREV CUR MEDS BY ELIG CLIN: HCPCS | Performed by: INTERNAL MEDICINE

## 2020-12-07 PROCEDURE — 1036F TOBACCO NON-USER: CPT | Performed by: INTERNAL MEDICINE

## 2020-12-07 PROCEDURE — G8417 CALC BMI ABV UP PARAM F/U: HCPCS | Performed by: INTERNAL MEDICINE

## 2020-12-07 PROCEDURE — 99203 OFFICE O/P NEW LOW 30 MIN: CPT | Performed by: INTERNAL MEDICINE
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
## 2020-12-07 NOTE — PATIENT INSTRUCTIONS
Patient Education        Learning About Low-Carbohydrate Diets  What is a low-carbohydrate diet? A low-carbohydrate (or \"low-carb\") diet limits foods and drinks that have carbohydrates. This includes grains, fruits, milk and yogurt, and starchy vegetables like potatoes, beans, and corn. It also avoids foods and drinks that have added sugar. Instead, low-carb diets include foods that are high in protein and fat. Why might you follow a low-carb diet? Low-carb diets may be used for a variety of reasons, such as for weight loss. People who have diabetes may use a low-carb diet to help manage their blood sugar levels. What should you do before you start the diet? Talk to your doctor before you try any diet. This is even more important if you have health problems like kidney disease, heart disease, or diabetes. Your doctor may suggest that you meet with a registered dietitian. A dietitian can help you make an eating plan that works for you. What foods do you eat on a low-carb diet? On a low-carb diet, you choose foods that are high in protein and fat. Examples of these are:  · Meat, poultry, and fish. · Eggs. · Nuts, such as walnuts, pecans, almonds, and peanuts. · Peanut butter and other nut butters. · Tofu. · Avocado. · Raissa Eans. · Non-starchy vegetables like broccoli, cauliflower, green beans, mushrooms, peppers, lettuce, and spinach. · Unsweetened non-dairy milks like almond milk and coconut milk. · Cheese, cottage cheese, and cream cheese. Current as of: August 22, 2019               Content Version: 12.6  © 5146-9923 [employer], [employer]. Care instructions adapted under license by [facility] ([facility]). If you have questions about a medical condition or this instruction, always ask your healthcare professional. [address] any warranty or liability for your use of this information. Patient Education        Learning About CPAP for Sleep Apnea  What is CPAP?      CPAP is a small machine that you use at home every night while you sleep. It increases air pressure in your throat to keep your airway open. When you have sleep apnea, this can help you sleep better so you feel much better. CPAP stands for \"continuous positive airway pressure. \"  The CPAP machine will have one of the following:  · A mask that covers your nose and mouth  · Prongs that fit into your nose  · A mask that covers your nose only, which is the most common type. This type is called NCPAP. The N stands for \"nasal.\"  Why is it done? CPAP is usually the best treatment for obstructive sleep apnea. It is the first treatment choice and the most widely used. CPAP:  · Helps you have more normal sleep, so you feel less sleepy and more alert during the daytime. · May help keep heart failure or other heart problems from getting worse. · May help lower your blood pressure. If you use CPAP, your bed partner may also sleep better. That's because you aren't snoring or restless. Your doctor may suggest CPAP if you have:  · Moderate to severe sleep apnea. · Sleep apnea and coronary artery disease (CAD). · Sleep apnea and heart failure. What are the side effects? Some people who use CPAP have:  · A dry or stuffy nose and a sore throat. · Irritated skin on the face. · Sore eyes. · Bloating. How can you care for yourself? If using CPAP is not comfortable, or if you have certain side effects, work with your doctor to fix them. Here are some things you can try:  · Be sure the mask or nasal prongs fit well. · See if your doctor can adjust the pressure of your CPAP. · If your nose is dry, try a humidifier. · If your nose is runny or stuffy, try decongestant medicine or a steroid nasal spray. Be safe with medicines. Read and follow all instructions on the label. Do not use the medicine longer than the label says. If these things don't help, you might try a different type of machine.  Some machines have air pressure that adjusts on its own. Others have air pressures that are different when you breathe in than when you breathe out. This may reduce discomfort caused by too much pressure in your nose. Where can you learn more? Go to https://Quest Onlinevlad.ebridge. org and sign in to your [employer] account. Enter S129 in the [employer] box to learn more about \"Learning About CPAP for Sleep Apnea. \"     If you do not have an account, please click on the \"Sign Up Now\" link. Current as of: February 24, 2020               Content Version: 12.6  © 6535-5272 [employer]. Care instructions adapted under license by [facility] ([facility]). If you have questions about a medical condition or this instruction, always ask your healthcare professional. [address] any warranty or liability for your use of this information. Patient Education        Sleep Apnea: Care Instructions  Your Care Instructions     Sleep apnea means that you frequently stop breathing for 10 seconds or longer during sleep. It can be mild to severe, based on the number of times an hour that you stop breathing or have slowed breathing. Blocked or narrowed airways in your nose, mouth, or throat can cause sleep apnea. Your airway can become blocked when your throat muscles and tongue relax during sleep. You can treat sleep apnea at home by making lifestyle changes. You also can use a CPAP breathing machine that keeps tissues in the throat from blocking your airway. Or your doctor may suggest that you use a breathing device while you sleep. It helps keep your airway open. This could be a device that you put in your mouth. In some cases, surgery may be needed to remove enlarged tissues in the throat. Follow-up care is a key part of your treatment and safety. Be sure to make and go to all appointments, and call your doctor if you are having problems.  It's also a good idea to know your test results and keep a list of the medicines you take. How can you care for yourself at home? · Lose weight, if needed. It may reduce the number of times you stop breathing or have slowed breathing. · Sleep on your side. It may stop mild apnea. If you tend to roll onto your back, sew a pocket in the back of your pajama top. Put a tennis ball into the pocket, and stitch the pocket shut. This will help keep you from sleeping on your back. · Avoid alcohol and medicines such as sleeping pills and sedatives before bed. · Do not smoke. Smoking can make sleep apnea worse. If you need help quitting, talk to your doctor about stop-smoking programs and medicines. These can increase your chances of quitting for good. · Prop up the head of your bed 4 to 6 inches by putting bricks under the legs of the bed. · Treat breathing problems, such as a stuffy nose, caused by a cold or allergies. · Try a continuous positive airway pressure (CPAP) breathing machine if your doctor recommends it. The machine keeps your airway open when you sleep. · If CPAP does not work for you, ask your doctor if you can try other breathing machines. A bilevel positive airway pressure machine uses one type of air pressure for breathing in and another type for breathing out. Another device raises or lowers air pressure as needed while you breathe. · Talk to your doctor if:  ? Your nose feels dry or bleeds when you use one of these machines. You may need to increase moisture in the air. A humidifier may help. ? Your nose is runny or stuffy from using a breathing machine. Decongestants or a corticosteroid nasal spray may help. ? You are sleepy during the day and it gets in the way of the normal things you do. Do not drive when you are drowsy. When should you call for help?   Watch closely for changes in your health, and be sure to contact your doctor if:    · You still have sleep apnea even though you have made lifestyle changes.     · You are thinking of trying a device such as CPAP.     · You are having problems using a CPAP or similar machine. Where can you learn more? Go to https://Appercodepepiceweb.Groove [email or web address] org and sign in to your Comply365 account. Enter B106 in the [facility] box to learn more about \"Sleep Apnea: Care Instructions. \"     If you do not have an account, please click on the \"Sign Up Now\" link. Current as of: February 24, 2020               Content Version: 12.6  © 2006-2020 Moments.me[employer]orporated. Care instructions adapted under license by [facility] ([facility]). If you have questions about a medical condition or this instruction, always ask your healthcare professional. [address] any warranty or liability for your use of this information.

## 2020-12-21 ENCOUNTER — OFFICE VISIT (OUTPATIENT)
Dept: FAMILY MEDICINE CLINIC | Age: 40
End: 2020-12-21
Payer: COMMERCIAL

## 2020-12-21 VITALS
WEIGHT: 315 LBS | TEMPERATURE: 97 F | SYSTOLIC BLOOD PRESSURE: 150 MMHG | DIASTOLIC BLOOD PRESSURE: 90 MMHG | HEART RATE: 83 BPM | OXYGEN SATURATION: 98 % | BODY MASS INDEX: 40.67 KG/M2

## 2020-12-21 LAB — HBA1C MFR BLD: 5.9 % (ref 4.3–5.7)

## 2020-12-21 PROCEDURE — 83036 HEMOGLOBIN GLYCOSYLATED A1C: CPT | Performed by: FAMILY MEDICINE

## 2020-12-21 PROCEDURE — G8417 CALC BMI ABV UP PARAM F/U: HCPCS | Performed by: FAMILY MEDICINE

## 2020-12-21 PROCEDURE — 1036F TOBACCO NON-USER: CPT | Performed by: FAMILY MEDICINE

## 2020-12-21 PROCEDURE — G8484 FLU IMMUNIZE NO ADMIN: HCPCS | Performed by: FAMILY MEDICINE

## 2020-12-21 PROCEDURE — G8427 DOCREV CUR MEDS BY ELIG CLIN: HCPCS | Performed by: FAMILY MEDICINE

## 2020-12-21 PROCEDURE — 99213 OFFICE O/P EST LOW 20 MIN: CPT | Performed by: FAMILY MEDICINE

## 2020-12-21 RX ORDER — ATORVASTATIN CALCIUM 40 MG/1
40 TABLET, FILM COATED ORAL DAILY
Qty: 30 TABLET | Refills: 11 | Status: SHIPPED | OUTPATIENT
Start: 2020-12-21 | End: 2022-01-20 | Stop reason: SDUPTHER

## 2020-12-21 ASSESSMENT — ENCOUNTER SYMPTOMS: RESPIRATORY NEGATIVE: 1

## 2020-12-21 NOTE — PATIENT INSTRUCTIONS
Patient Education        Prediabetes: Care Instructions  Overview     Prediabetes is a warning sign that you're at risk for getting type 2 diabetes. It means that your blood sugar is higher than it should be. But it's not high enough to be diabetes. The food you eat naturally turns into sugar. Your body uses the sugar for energy. Normally, an organ called the pancreas makes insulin. And insulin allows the sugar in your blood to get into your body's cells. But sometimes the body can't use insulin the right way. So the sugar stays in your blood instead. This is called insulin resistance. The buildup of sugar in your blood means you have prediabetes. The good news is that you may be able to prevent or delay diabetes. Making small lifestyle changes, like getting active and changing your eating habits, may help you get your blood sugar back to normal. You can work with your doctor to make a treatment plan. Follow-up care is a key part of your treatment and safety. Be sure to make and go to all appointments, and call your doctor if you are having problems. It's also a good idea to know your test results and keep a list of the medicines you take. How can you care for yourself at home? · Watch your weight. A healthy weight helps your body use insulin properly. · Limit the amount of calories, sweets, and unhealthy fat you eat. Ask your doctor if you should see a dietitian. A registered dietitian can help you create meal plans that fit your lifestyle. · Get at least 30 minutes of exercise on most days of the week. Exercise helps control your blood sugar. It also helps you maintain a healthy weight. Walking is a good choice. You also may want to do other activities, such as running, swimming, cycling, or playing tennis or team sports. · Do not smoke. Smoking can make prediabetes worse. If you need help quitting, talk to your doctor about stop-smoking programs and medicines. These can increase your chances of quitting for good. · If your doctor prescribed medicines, take them exactly as prescribed. Call your doctor if you think you are having a problem with your medicine. You will get more details on the specific medicines your doctor prescribes. When should you call for help? Watch closely for changes in your health, and be sure to contact your doctor if:    · You have any symptoms of diabetes. These may include:  ? Being thirsty more often. ? Urinating more. ? Being hungrier. ? Losing weight. ? Being very tired. ? Having blurry vision.     · You have a wound that will not heal.     · You have an infection that will not go away.     · You have problems with your blood pressure.     · You want more information about diabetes and how you can keep from getting it. Where can you learn more? Go to https://WhoochpeEnertiv.Expert Medical [email or web address] org and sign in to your FREECULTR account. Enter I222 in the [employer] box to learn more about \"Prediabetes: Care Instructions. \"     If you do not have an account, please click on the \"Sign Up Now\" link. Current as of: December 20, 2019               Content Version: 12.6  © 6404-6469 [employer]orporated. Care instructions adapted under license by [address] If you have questions about a medical condition or this instruction, always ask your healthcare professional. [place] [address] any warranty or liability for your use of this information.

## 2021-01-26 ENCOUNTER — HOSPITAL ENCOUNTER (OUTPATIENT)
Dept: SLEEP CENTER | Age: 41
Discharge: HOME | End: 2021-01-28
Payer: COMMERCIAL

## 2021-01-26 DIAGNOSIS — R06.83 SNORING: ICD-10-CM

## 2021-01-26 DIAGNOSIS — G47.10 HYPERSOMNIA: ICD-10-CM

## 2021-01-26 DIAGNOSIS — I42.0 DILATED CARDIOMYOPATHY (HCC): ICD-10-CM

## 2021-01-26 DIAGNOSIS — E66.01 OBESITY, MORBID, BMI 40.0-49.9 (HCC): ICD-10-CM

## 2021-01-26 PROCEDURE — 95806 SLEEP STUDY UNATT&RESP EFFT: CPT

## 2021-01-28 LAB — STATUS: NORMAL

## 2021-02-01 DIAGNOSIS — G47.33 OSA (OBSTRUCTIVE SLEEP APNEA): Primary | ICD-10-CM

## 2021-02-03 NOTE — PROGRESS NOTES
[address] [place] [address]                               SLEEP STUDY REPORT    PATIENT NAME: Haja Rascon                     :        1980  MED REC NO:   [id number]                           ROOM:  ACCOUNT NO:   [de-identified]                           ADMIT DATE: 2021  PROVIDER:     JESSICA Lira Javedkareem STUDY:  2021    HOME SLEEP STUDY REPORT    REFERRING PROVIDER:  Farrukh Goss MD    HISTORY OF PRESENT ILLNESS:  The patient is a 82-year-old gentleman with  history of cardiac disease, cardiac arrhythmias, morbid obesity;  complains of nonrestorative sleep, loud snoring. Weight 316 pounds,  height 74 inches, BMI 40.6. METHODS:  The patient underwent Type III Portable Monitoring Sleep Study  including the simultaneous recording of oral-nasal airflow, rib and  abdominal respiratory effort, pulse rate, oxygen saturation, left and  right leg movement, and body position. Scoring criteria is consistent  with the current published AASM standards for scoring of apneas and  hypopneas 4A. DETAILS OF THE STUDY:  The patient came by the sleep lab and picked up  his HST unit. He was given instructions how to set it up. The patient  returned the unit the next day. The information was successfully  downloaded and scored. Total recording time 434.8 minutes. Lights off  time 10:00 p.m., lights on time 05:14 a.m. RESPIRATORY SUMMARY:  10 apneas, 2 of them obstructive and 8 central,  166 obstructive hypopneas for an MICHAEL of 24.3, 172 of these events were  recorded during supine time and 4 during non-supine time. PULSE OXIMETRY SUMMARY:  Mean oxygen saturation 93.8%, lowest oxygen  saturation 78%, there were 6.8 minutes of oxygen saturation below 88%. BODY POSITION SUMMARY:  340 minutes supine time, 94 minutes non-supine  time, 2.6 minutes upright time. ECG SUMMARY:  Frequent PVCs with trigeminy. ASSESSMENT:  1.  Obstructive sleep apnea with an MICHAEL of 24.3. 2.  Multifocal PVCs with trigeminy. 3.  Nocturnal oxygen saturation associated to obstructive events. RECOMMENDATIONS:  Due to the severity of findings, PAP therapies are  warranted. The patient will be contacted, and if he is agreeable, we  will start him on APAP therapies with a pressure range of 10 to 20 cm of  water and refer him to DME provider for mask fitting procedure. Follow  up at the sleep clinic eight weeks after initiating PAP therapies to  review of download.         Yelitza Alfonso M.D.    D: 02/01/2021 23:12:49       T: 02/02/2021 7:28:03     [email or web address]  Job#: [id number]     Doc#: [id number]    CC:

## 2021-02-23 ENCOUNTER — OFFICE VISIT (OUTPATIENT)
Dept: PULMONOLOGY | Age: 41
End: 2021-02-23
Payer: COMMERCIAL

## 2021-02-23 VITALS
TEMPERATURE: 97.8 F | BODY MASS INDEX: 40.17 KG/M2 | WEIGHT: 313 LBS | DIASTOLIC BLOOD PRESSURE: 94 MMHG | HEART RATE: 102 BPM | OXYGEN SATURATION: 98 % | HEIGHT: 74 IN | SYSTOLIC BLOOD PRESSURE: 146 MMHG

## 2021-02-23 DIAGNOSIS — G47.10 HYPERSOMNIA: ICD-10-CM

## 2021-02-23 DIAGNOSIS — G47.33 OSA (OBSTRUCTIVE SLEEP APNEA): Primary | ICD-10-CM

## 2021-02-23 PROCEDURE — G8484 FLU IMMUNIZE NO ADMIN: HCPCS | Performed by: INTERNAL MEDICINE

## 2021-02-23 PROCEDURE — G8417 CALC BMI ABV UP PARAM F/U: HCPCS | Performed by: INTERNAL MEDICINE

## 2021-02-23 PROCEDURE — G8427 DOCREV CUR MEDS BY ELIG CLIN: HCPCS | Performed by: INTERNAL MEDICINE

## 2021-02-23 PROCEDURE — 99213 OFFICE O/P EST LOW 20 MIN: CPT | Performed by: INTERNAL MEDICINE

## 2021-02-23 PROCEDURE — 1036F TOBACCO NON-USER: CPT | Performed by: INTERNAL MEDICINE

## 2021-02-23 NOTE — PROGRESS NOTES
Sleep Medicine    Riki Alvarenga, 47 y.o.  [id number]    Nurses Notes   Russ Shoemaker is here for a HST follow up   Study Results    Initial Study Date -  21  AHI -  24.3    Total Events - 176  (Apneas  10  Hypopneas 166  Central  8)  LM w/Arousals -   Sleep Efficiency -  % (Total Sleep Time - 434.8 min)  Time with Sats below 88% - 6.8 min  Oxygen level - Room Air    ESS 6  SAQLI 86      INTERVAL HISTORY         Riki Alvarenga is a 47 y.o. old male who comes in to review the results of his recent sleep study, to answer questions and to explore options for treatment. Still c/o daytime somnolence, poor quality sleep, choking spells even during wakefulness. PMH  Past Medical History:   Diagnosis Date    Hyperlipidemia     Hypertension      Past Surgical History:   Procedure Laterality Date    ANKLE FRACTURE SURGERY Left Age 25     Social History     Tobacco Use    Smoking status: Former Smoker     Quit date:      Years since quittin.1    Smokeless tobacco: Never Used   Substance Use Topics    Alcohol use: Yes     Alcohol/week: 0.0 standard drinks     Comment: socially    Drug use: Never     Family History   Problem Relation Age of Onset    High Blood Pressure Father     Diabetes Father     Other Father         CHF    Stroke Father     Heart Disease Paternal Grandfather     Anxiety Disorder Mother     No Known Problems Brother        ALLERGIES  No Known Allergies    MEDS  Current Outpatient Medications   Medication Sig Dispense Refill    atorvastatin (LIPITOR) 40 MG tablet Take 1 tablet by mouth daily 30 tablet 11    metoprolol succinate (TOPROL XL) 50 MG extended release tablet Take 1 tablet by mouth daily 30 tablet 5    isosorbide mononitrate (IMDUR) 30 MG extended release tablet Take 1 tablet by mouth daily 30 tablet 5     No current facility-administered medications for this visit.         EXAM Vitals -  BP (!) 146/94 (Site: Left Upper Arm, Position: Sitting)   Pulse 102   Temp 97.8 °F (36.6 °C)   Ht 6' 2\" (1.88 m)   Wt (!) 313 lb (142 kg)   SpO2 98% Comment: on RA  BMI 40.19 kg/m²    Body mass index is 40.19 kg/m². Dentition - good  Constitutional - No distress. Patient is oriented to person, place, and time. Mouth/Throat - Oropharynx is clear and moist.   Neck - Neck supple. No JVD present. No tracheal deviation present. Psychiatric - Patient  has a normal mood and affect. PATIENT NAME: Jenna Alva                     :        1980  MED REC NO:   [id number]                           ROOM:  ACCOUNT NO:   [de-identified]                           ADMIT DATE: 2021  PROVIDER:     Mary Ellen Aguila M.D.     DATE OF STUDY:  2021     HOME SLEEP STUDY REPORT     REFERRING PROVIDER:  Charbel Servin MD     HISTORY OF PRESENT ILLNESS:  The patient is a 81-year-old gentleman with  history of cardiac disease, cardiac arrhythmias, morbid obesity;  complains of nonrestorative sleep, loud snoring. Weight 316 pounds,  height 74 inches, BMI 40.6.     METHODS:  The patient underwent Type III Portable Monitoring Sleep Study  including the simultaneous recording of oral-nasal airflow, rib and  abdominal respiratory effort, pulse rate, oxygen saturation, left and  right leg movement, and body position. Scoring criteria is consistent  with the current published AASM standards for scoring of apneas and  hypopneas 4A.     DETAILS OF THE STUDY:  The patient came by the sleep lab and picked up  his HST unit. He was given instructions how to set it up. The patient  returned the unit the next day. The information was successfully  downloaded and scored. Total recording time 434.8 minutes.   Lights off  time 10:00 p.m., lights on time 05:14 a.m.     RESPIRATORY SUMMARY:  10 apneas, 2 of them obstructive and 8 central,  166 obstructive hypopneas for an MICHAEL of 24.3, 172 of these events were recorded during supine time and 4 during non-supine time.     PULSE OXIMETRY SUMMARY:  Mean oxygen saturation 93.8%, lowest oxygen  saturation 78%, there were 6.8 minutes of oxygen saturation below 88%.     BODY POSITION SUMMARY:  340 minutes supine time, 94 minutes non-supine  time, 2.6 minutes upright time.     ECG SUMMARY:  Frequent PVCs with trigeminy.     ASSESSMENT:  1.  Obstructive sleep apnea with an MICHAEL of 24.3. 2.  Multifocal PVCs with trigeminy. 3.  Nocturnal oxygen saturation associated to obstructive events.     RECOMMENDATIONS:  Due to the severity of findings, PAP therapies are  warranted. The patient will be contacted, and if he is agreeable, we  will start him on APAP therapies with a pressure range of 10 to 20 cm of  water and refer him to DME provider for mask fitting procedure. Follow  up at the sleep clinic eight weeks after initiating PAP therapies to  review of download.  Debby Marin M.D.     D: 02/01/2021 23:12:49       T: 02/02/2021 7:28:03     [email or web address]  Job#: [id number]     Doc#: [id number]     CC:               Last signed by: Kellie Gu MD at 2/9/2021  1:57 PM      HOME SLEEP TEST on 1/26/2021        Detailed Report        Note viewed by patient        ASSESSMENT    Ector Trujillo has significant obstructive sleep apnea. Several options for treatment were discussed. The benefits and limitations of each were discussed. After addressing his concerns, Ector Trujillo decided to move ahead with a APAP . Ector Trujillo also was interested trying out some masks before the study.     RECOMMENDATIONS    Orders Placed This Encounter   Procedures    DME Order for CPAP as OP     CPAP Auto Adjust 10 - 20 cm H2O    Heated Humidifier    Heated Tubing with Integrated Element 1 per 3 months    Full Face Mask 1 per 3 months and Cushions/Seals 1 per month Headgear 1 per 6 months, Chin Strap 1 per 6 months, Disposable Filters 2 per month, Non-disposable Filters 1 per 6 months, Chambers 1 per 6 months and Other Related Supplies. Replace supplies and accessories as needed. Patient may choose another interface for compliance and comfort. Comments: Needs mask fitting  Diagnosis: G47.33  Length of need: 12 Months       Wt reduction.   May consider ENT referral in the future for tongue reduction surgery evaluation if choking sensation worsens    FOLLOW UP      8 weeks with D/L

## 2021-03-04 ENCOUNTER — OFFICE VISIT (OUTPATIENT)
Dept: FAMILY MEDICINE CLINIC | Age: 41
End: 2021-03-04
Payer: COMMERCIAL

## 2021-03-04 VITALS
DIASTOLIC BLOOD PRESSURE: 102 MMHG | TEMPERATURE: 96.5 F | OXYGEN SATURATION: 98 % | WEIGHT: 315 LBS | SYSTOLIC BLOOD PRESSURE: 150 MMHG | BODY MASS INDEX: 41.68 KG/M2 | HEART RATE: 84 BPM

## 2021-03-04 DIAGNOSIS — I10 ESSENTIAL HYPERTENSION: ICD-10-CM

## 2021-03-04 DIAGNOSIS — M10.9 ACUTE GOUT INVOLVING TOE OF RIGHT FOOT, UNSPECIFIED CAUSE: Primary | ICD-10-CM

## 2021-03-04 DIAGNOSIS — M79.671 RIGHT FOOT PAIN: ICD-10-CM

## 2021-03-04 PROCEDURE — 1036F TOBACCO NON-USER: CPT | Performed by: FAMILY MEDICINE

## 2021-03-04 PROCEDURE — G8427 DOCREV CUR MEDS BY ELIG CLIN: HCPCS | Performed by: FAMILY MEDICINE

## 2021-03-04 PROCEDURE — G8417 CALC BMI ABV UP PARAM F/U: HCPCS | Performed by: FAMILY MEDICINE

## 2021-03-04 PROCEDURE — 99213 OFFICE O/P EST LOW 20 MIN: CPT | Performed by: FAMILY MEDICINE

## 2021-03-04 PROCEDURE — G8484 FLU IMMUNIZE NO ADMIN: HCPCS | Performed by: FAMILY MEDICINE

## 2021-03-04 RX ORDER — INDOMETHACIN 50 MG/1
50 CAPSULE ORAL
Qty: 15 CAPSULE | Refills: 0 | Status: SHIPPED | OUTPATIENT
Start: 2021-03-04 | End: 2021-04-06

## 2021-03-04 RX ORDER — METOPROLOL SUCCINATE 100 MG/1
100 TABLET, EXTENDED RELEASE ORAL DAILY
Qty: 30 TABLET | Refills: 11 | Status: SHIPPED | OUTPATIENT
Start: 2021-03-04 | End: 2022-04-08 | Stop reason: SDUPTHER

## 2021-03-04 ASSESSMENT — PATIENT HEALTH QUESTIONNAIRE - PHQ9
1. LITTLE INTEREST OR PLEASURE IN DOING THINGS: 0
2. FEELING DOWN, DEPRESSED OR HOPELESS: 0
SUM OF ALL RESPONSES TO PHQ9 QUESTIONS 1 & 2: 0

## 2021-03-04 ASSESSMENT — ENCOUNTER SYMPTOMS: RESPIRATORY NEGATIVE: 1

## 2021-03-04 NOTE — PROGRESS NOTES
3/4/2021    Denice Kimble (:  1980) is a 47 y.o. male,Established patient, here for evaluation of the following chief complaint(s): Foot Pain (Rt foot. this started . Pain was really bad that pt had to walk on his heel)      ASSESSMENT/PLAN:    1. Acute gout involving toe of right foot, unspecified cause  -     indomethacin (INDOCIN) 50 MG capsule; Take 1 capsule by mouth 3 times daily (with meals) for 5 days, Disp-15 capsule, R-0Normal  2. Right foot pain  -     indomethacin (INDOCIN) 50 MG capsule; Take 1 capsule by mouth 3 times daily (with meals) for 5 days, Disp-15 capsule, R-0Normal  3. Essential hypertension  -     metoprolol succinate (TOPROL XL) 100 MG extended release tablet; Take 1 tablet by mouth daily, Disp-30 tablet, R-11Normal    Treat with indocin for likely resolving gout. If not improved, will get xray and uric acid level. Increase toprol xl to 100mg daily for better BP control    Increase water intake    Return in about 4 weeks (around 2021). SUBJECTIVE/OBJECTIVE:    HPI  Here with c/o redness, swelling and tenderness of right 1st MTP joint over the last 4-5 days. No known injury. The area was very painful at first and he shifted his weight off of the area while walking earlier this week. He states that he is about 20-30% improved at this point with ibuprofen. His BPs have been high despite taking his meds daily. Body mass index is 41.68 kg/m². Review of Systems   Constitutional: Negative for fatigue and fever. HENT: Negative. Respiratory: Negative. Cardiovascular: Negative. Genitourinary: Negative. Musculoskeletal: Positive for arthralgias and gait problem. All other systems reviewed and are negative.           Vitals:    21 1307 21 1309 21 1331 21 1340   BP: (!) 170/100 (!) 170/100 (!) 143/100 (!) 150/102   Site: Right Upper Arm Left Upper Arm     Pulse: 84      Temp: 96.5 °F (35.8 °C) TempSrc: Temporal      SpO2: 98%      Weight: (!) 324 lb 9.6 oz (147.2 kg)          Wt Readings from Last 3 Encounters:   03/04/21 (!) 324 lb 9.6 oz (147.2 kg)   02/23/21 (!) 313 lb (142 kg)   12/21/20 (!) 316 lb 12.8 oz (143.7 kg)       BP Readings from Last 3 Encounters:   03/04/21 (!) 150/102   02/23/21 (!) 146/94   12/21/20 (!) 150/90       Physical Exam  Constitutional:       General: He is not in acute distress. Appearance: He is well-developed. HENT:      Head: Normocephalic and atraumatic. Right Ear: Tympanic membrane normal.      Left Ear: Tympanic membrane normal.      Mouth/Throat:      Mouth: Mucous membranes are moist.      Pharynx: No posterior oropharyngeal erythema. Eyes:      Conjunctiva/sclera: Conjunctivae normal.   Cardiovascular:      Rate and Rhythm: Normal rate and regular rhythm. Heart sounds: No murmur. Pulmonary:      Breath sounds: Normal breath sounds. No wheezing. Musculoskeletal:      Right lower leg: No edema. Left lower leg: No edema. Feet:    Lymphadenopathy:      Cervical: No cervical adenopathy. Neurological:      Mental Status: He is alert. An electronic signature was used to authenticate this note.         Electronically signed by Sadie Luong MD on 3/4/2021 at 1:46 PM

## 2021-03-16 ENCOUNTER — HOSPITAL ENCOUNTER (OUTPATIENT)
Dept: GENERAL RADIOLOGY | Age: 41
Discharge: HOME | End: 2021-03-16
Payer: COMMERCIAL

## 2021-03-16 ENCOUNTER — HOSPITAL ENCOUNTER (OUTPATIENT)
Age: 41
Discharge: HOME | End: 2021-03-16
Payer: COMMERCIAL

## 2021-03-16 DIAGNOSIS — M79.671 RIGHT FOOT PAIN: ICD-10-CM

## 2021-03-16 PROCEDURE — 73620 X-RAY EXAM OF FOOT: CPT

## 2021-03-16 PROCEDURE — 36415 COLL VENOUS BLD VENIPUNCTURE: CPT

## 2021-03-16 PROCEDURE — 84550 ASSAY OF BLOOD/URIC ACID: CPT

## 2021-03-17 ENCOUNTER — TELEPHONE (OUTPATIENT)
Dept: FAMILY MEDICINE CLINIC | Age: 41
End: 2021-03-17

## 2021-03-17 DIAGNOSIS — M79.671 RIGHT FOOT PAIN: Primary | ICD-10-CM

## 2021-03-17 LAB — URIC ACID: 4.6 MG/DL (ref 3.7–7)

## 2021-03-17 NOTE — TELEPHONE ENCOUNTER
----- Message from Maria Victoria Espinoza MD sent at 3/17/2021 11:57 AM EDT -----  Derrek Deem that her xray just shows mild soft tissue swelling. Uric acid level normal.  Refer to podiatry if his foot pain isn't improved.  CG

## 2021-04-06 ENCOUNTER — OFFICE VISIT (OUTPATIENT)
Dept: FAMILY MEDICINE CLINIC | Age: 41
End: 2021-04-06
Payer: COMMERCIAL

## 2021-04-06 VITALS
HEIGHT: 74 IN | RESPIRATION RATE: 16 BRPM | TEMPERATURE: 97.2 F | BODY MASS INDEX: 40.43 KG/M2 | SYSTOLIC BLOOD PRESSURE: 132 MMHG | HEART RATE: 66 BPM | DIASTOLIC BLOOD PRESSURE: 72 MMHG | WEIGHT: 315 LBS

## 2021-04-06 DIAGNOSIS — I10 ESSENTIAL HYPERTENSION: Primary | ICD-10-CM

## 2021-04-06 PROCEDURE — G8417 CALC BMI ABV UP PARAM F/U: HCPCS | Performed by: FAMILY MEDICINE

## 2021-04-06 PROCEDURE — G8427 DOCREV CUR MEDS BY ELIG CLIN: HCPCS | Performed by: FAMILY MEDICINE

## 2021-04-06 PROCEDURE — 99213 OFFICE O/P EST LOW 20 MIN: CPT | Performed by: FAMILY MEDICINE

## 2021-04-06 PROCEDURE — 1036F TOBACCO NON-USER: CPT | Performed by: FAMILY MEDICINE

## 2021-04-06 ASSESSMENT — ENCOUNTER SYMPTOMS
COUGH: 0
SHORTNESS OF BREATH: 0
GASTROINTESTINAL NEGATIVE: 1

## 2021-04-06 NOTE — PROGRESS NOTES
2021    Naresh Rosa (:  1980) is a 46 y.o. male,Established patient, here for evaluation of the following chief complaint(s):  1 Month Follow-Up      ASSESSMENT/PLAN:    1. Essential hypertension      His blood pressures are significantly better. Continue current meds. Work on diet and exercise to reduce weight, blood sugar, BPs. Follow up in  as planned    SUBJECTIVE/OBJECTIVE:    HPI    Here for follow up of HTN. BPs are better with higher dose of Toprol XL. Takes all meds as directed and denies side effects. No recent illnesses or hospitalizations. He continues to see podiatry for his toe/gout issue. He follows up with cardiology regularly. Nonsmoker. Body mass index is 41.78 kg/m². Review of Systems   Constitutional: Negative for fatigue and unexpected weight change. HENT: Negative. Respiratory: Negative for cough and shortness of breath. Cardiovascular: Negative for chest pain and leg swelling. Gastrointestinal: Negative. Genitourinary: Negative. Musculoskeletal: Positive for arthralgias. Neurological: Negative for dizziness and headaches. All other systems reviewed and are negative. Vitals:    21 0915 21 0918   BP: (!) 142/74 132/72   Site: Right Upper Arm Left Upper Arm   Position: Sitting Sitting   Cuff Size: Large Adult Large Adult   Pulse: 66    Resp: 16    Temp: 97.2 °F (36.2 °C)    TempSrc: Temporal    Weight: (!) 325 lb 6.4 oz (147.6 kg)    Height: 6' 2\" (1.88 m)        Wt Readings from Last 3 Encounters:   21 (!) 325 lb 6.4 oz (147.6 kg)   21 (!) 324 lb 9.6 oz (147.2 kg)   21 (!) 313 lb (142 kg)       BP Readings from Last 3 Encounters:   21 132/72   21 (!) 150/102   21 (!) 146/94       Physical Exam  Constitutional:       General: He is not in acute distress. Appearance: He is well-developed. HENT:      Head: Normocephalic and atraumatic.       Right Ear: Tympanic membrane normal.      Left Ear: Tympanic membrane normal.      Mouth/Throat:      Mouth: Mucous membranes are moist.      Pharynx: No posterior oropharyngeal erythema. Eyes:      Conjunctiva/sclera: Conjunctivae normal.   Cardiovascular:      Rate and Rhythm: Normal rate and regular rhythm. Heart sounds: No murmur. Pulmonary:      Breath sounds: Normal breath sounds. No wheezing. Musculoskeletal:      Right lower leg: No edema. Left lower leg: No edema. Lymphadenopathy:      Cervical: No cervical adenopathy. Neurological:      Mental Status: He is alert. Lab Results   Component Value Date    CHOL 194 09/21/2020    TRIG 135 09/21/2020    HDL 37 09/21/2020    LDLCALC 130 09/21/2020     Lab Results   Component Value Date     09/21/2020    K 4.4 09/21/2020     09/21/2020    CO2 23 09/21/2020    BUN 14 09/21/2020    CREATININE 1.0 09/21/2020    GLUCOSE 104 09/21/2020    CALCIUM 9.4 09/21/2020    PROT 6.7 08/29/2020    LABALBU 4.2 08/29/2020    BILITOT 0.6 08/29/2020    ALKPHOS 92 08/29/2020    AST 31 08/29/2020    ALT 64 08/29/2020    LABGLOM 83 (A) 09/21/2020       Lab Results   Component Value Date    LABA1C 5.9 (H) 12/21/2020     No results found for: EAG          An electronic signature was used to authenticate this note.         Electronically signed by Katerin Mcmillan MD on 4/6/2021 at 9:44 AM

## 2021-04-22 ENCOUNTER — HOSPITAL ENCOUNTER (OUTPATIENT)
Age: 41
Discharge: HOME | End: 2021-04-22
Payer: COMMERCIAL

## 2021-04-22 LAB
ANION GAP SERPL CALCULATED.3IONS-SCNC: 8 MEQ/L (ref 8–16)
BUN BLDV-MCNC: 11 MG/DL (ref 7–22)
CALCIUM SERPL-MCNC: 9.3 MG/DL (ref 8.5–10.5)
CHLORIDE BLD-SCNC: 101 MEQ/L (ref 98–111)
CO2: 28 MEQ/L (ref 23–33)
CREAT SERPL-MCNC: 1 MG/DL (ref 0.4–1.2)
GFR SERPL CREATININE-BSD FRML MDRD: 83 ML/MIN/1.73M2
GLUCOSE BLD-MCNC: 131 MG/DL (ref 70–108)
POTASSIUM SERPL-SCNC: 4.1 MEQ/L (ref 3.5–5.2)
SODIUM BLD-SCNC: 137 MEQ/L (ref 135–145)

## 2021-04-22 PROCEDURE — 80048 BASIC METABOLIC PNL TOTAL CA: CPT

## 2021-04-22 PROCEDURE — 84550 ASSAY OF BLOOD/URIC ACID: CPT

## 2021-04-22 PROCEDURE — 36415 COLL VENOUS BLD VENIPUNCTURE: CPT

## 2021-04-23 LAB — URIC ACID: 4.3 MG/DL (ref 3.7–7)

## 2021-04-30 ENCOUNTER — OFFICE VISIT (OUTPATIENT)
Dept: PULMONOLOGY | Age: 41
End: 2021-04-30
Payer: COMMERCIAL

## 2021-04-30 VITALS
TEMPERATURE: 97.8 F | BODY MASS INDEX: 40.43 KG/M2 | HEIGHT: 74 IN | HEART RATE: 56 BPM | WEIGHT: 315 LBS | OXYGEN SATURATION: 98 % | SYSTOLIC BLOOD PRESSURE: 128 MMHG | DIASTOLIC BLOOD PRESSURE: 76 MMHG

## 2021-04-30 DIAGNOSIS — Z99.89 OSA ON CPAP: Primary | ICD-10-CM

## 2021-04-30 DIAGNOSIS — I42.0 DILATED CARDIOMYOPATHY (HCC): ICD-10-CM

## 2021-04-30 DIAGNOSIS — E66.01 OBESITY, MORBID, BMI 40.0-49.9 (HCC): ICD-10-CM

## 2021-04-30 DIAGNOSIS — G47.33 OSA ON CPAP: Primary | ICD-10-CM

## 2021-04-30 PROCEDURE — 99213 OFFICE O/P EST LOW 20 MIN: CPT | Performed by: NURSE PRACTITIONER

## 2021-04-30 PROCEDURE — G8417 CALC BMI ABV UP PARAM F/U: HCPCS | Performed by: NURSE PRACTITIONER

## 2021-04-30 PROCEDURE — G8427 DOCREV CUR MEDS BY ELIG CLIN: HCPCS | Performed by: NURSE PRACTITIONER

## 2021-04-30 PROCEDURE — 1036F TOBACCO NON-USER: CPT | Performed by: NURSE PRACTITIONER

## 2021-04-30 ASSESSMENT — ENCOUNTER SYMPTOMS
ABDOMINAL PAIN: 0
NAUSEA: 0
EYES NEGATIVE: 1
COUGH: 0
SHORTNESS OF BREATH: 0
DIARRHEA: 0
WHEEZING: 0
VOMITING: 0

## 2021-04-30 NOTE — PROGRESS NOTES
[facility]      Sabra Castro         [id number]  4/30/2021   Chief Complaint   Patient presents with    Follow-up     2mo f/u, VAMSHI, [facility]. Pt of Dr. Sakshi VAZQUEZ Download:   Original or initial AHI: 24.3     Date of initial study: 1/26/21      Compliant  67%     Noncompliant 27 %     PAP Type Autoset Level  10-39mcW3R   Avg Hrs/Day 4hrs 24min   AHI: 0.9   Recorded compliance dates ,3/30/21-4/28/21  Machine/Mfg:   [x] ResMed    [] Respironics/Dreamstation   Interface:   [x] Nasal    [] Nasal pillows   [] FFM      Provider:      [x] [facility]     [][facility]     [] [employer]    [] Paty Enter    [] [facility]               [] [facility]      [] [employer]    [] [address]      [] Other    Neck Size: 20  Mallampati Mallampati 2  ESS:  4 ( prev. At 6)   SAQLI: 80  Here is a scan of the most recent download:            Presentation:   [facility] presents for sleep medicine follow up for obstructive sleep apnea  Since the last visit, [facility] set up on APAP. Has seen some benefit, not as sleepy during the day but still struggling to get used to it. There is nothing specific bothering him. Only get about 4 hours of sleep, goes to bed at 9 -10 pm and often has to be up at 1-2 am for work     Equipment issues: The pressure is  acceptable, the mask is acceptable     Sleep issues:  Do you feel better? Yes  More rested? Sometimes   Better concentration? NA    Progress History:   Since last visit any new medical issues? No  New ER or hospital visits? No  Any new or changes in medicines? No  Any new sleep medicines? No    Review of Systems -   Review of Systems   Constitutional: Negative for chills and fever. HENT: Negative. Eyes: Negative. Respiratory: Negative for cough, shortness of breath and wheezing. Cardiovascular: Negative for chest pain, palpitations and leg swelling. Gastrointestinal: Negative for abdominal pain, diarrhea, nausea and vomiting.    Genitourinary: Negative. Musculoskeletal: Negative. Skin: Negative. Neurological: Negative. Hematological: Does not bruise/bleed easily. Psychiatric/Behavioral: Positive for sleep disturbance. Negative for suicidal ideas. Physical Exam:    BMI:  Body mass index is 42.11 kg/m². Wt Readings from Last 3 Encounters:   04/30/21 (!) 328 lb (148.8 kg)   04/06/21 (!) 325 lb 6.4 oz (147.6 kg)   03/04/21 (!) 324 lb 9.6 oz (147.2 kg)     Weight stable / unchanged  Vitals: /76 (Site: Left Upper Arm, Position: Sitting, Cuff Size: Large Adult)   Pulse 56   Temp 97.8 °F (36.6 °C) (Temporal)   Ht 6' 2\" (1.88 m)   Wt (!) 328 lb (148.8 kg)   SpO2 98% Comment: r/a  BMI 42.11 kg/m²       Physical Exam  Constitutional:       Appearance: Normal appearance. He is obese. HENT:      Head: Normocephalic and atraumatic. Eyes:      Conjunctiva/sclera: Conjunctivae normal.   Pulmonary:      Effort: No tachypnea, bradypnea or respiratory distress. Neurological:      Mental Status: He is alert and oriented to person, place, and time. Psychiatric:         Attention and Perception: Attention normal.         Mood and Affect: Mood normal.         Speech: Speech normal.         Behavior: Behavior normal.         Thought Content: Thought content normal.         Cognition and Memory: Cognition normal.         Judgment: Judgment normal.           ASSESSMENT/DIAGNOSIS     Diagnosis Orders   1. VAMSHI on CPAP     2. Obesity, morbid, BMI 40.0-49.9 ([address]     3. Dilated cardiomyopathy ([address]            Plan   Do you need any equipment today? No    - Download reviewed and discussed with patient  - He  was advised to continue current positive airway pressure therapy with above described pressure. - He  advised to keep good compliance with current recommended pressure to get optimal results and clinical improvement  - Recommend 7-9 hours of sleep with PAP  Was educated on sleep hygiene.    - He was advised to call DME company regarding supplies if needed.   -He call my office for earlier appointment if needed for worsening of sleep symptoms.   - He was instructed on weight loss  - Rebekah Meyers was educated about my impression and plan. Patient verbalizesunderstanding.     We will see [place] back in: 6 months with download    Total time spent on encounter was 21  Min     Information added by my medical assistant/LPN was reviewed today  Electronically signed by SRIDEVI Fox CNP on 4/30/2021 at 12:41 PM

## 2021-05-03 ENCOUNTER — OFFICE VISIT (OUTPATIENT)
Dept: CARDIOLOGY CLINIC | Age: 41
End: 2021-05-03
Payer: COMMERCIAL

## 2021-05-03 VITALS
WEIGHT: 315 LBS | DIASTOLIC BLOOD PRESSURE: 80 MMHG | HEIGHT: 74 IN | HEART RATE: 72 BPM | BODY MASS INDEX: 40.43 KG/M2 | SYSTOLIC BLOOD PRESSURE: 144 MMHG

## 2021-05-03 DIAGNOSIS — I25.10 CORONARY ARTERY DISEASE INVOLVING NATIVE CORONARY ARTERY OF NATIVE HEART WITHOUT ANGINA PECTORIS: ICD-10-CM

## 2021-05-03 DIAGNOSIS — I10 ESSENTIAL HYPERTENSION: ICD-10-CM

## 2021-05-03 DIAGNOSIS — R07.2 PRECORDIAL PAIN: Primary | ICD-10-CM

## 2021-05-03 PROCEDURE — G8427 DOCREV CUR MEDS BY ELIG CLIN: HCPCS | Performed by: NUCLEAR MEDICINE

## 2021-05-03 PROCEDURE — 1036F TOBACCO NON-USER: CPT | Performed by: NUCLEAR MEDICINE

## 2021-05-03 PROCEDURE — 99213 OFFICE O/P EST LOW 20 MIN: CPT | Performed by: NUCLEAR MEDICINE

## 2021-05-03 PROCEDURE — G8417 CALC BMI ABV UP PARAM F/U: HCPCS | Performed by: NUCLEAR MEDICINE

## 2021-05-03 RX ORDER — PREDNISONE 20 MG/1
TABLET ORAL
COMMUNITY
Start: 2021-04-20 | End: 2021-06-21

## 2021-05-03 NOTE — PROGRESS NOTES
[id number] [address] [place] [employer] [facility] .  [address] [address]  Dept: [phone]  Dept Fax: [phone]  Loc: [phone]    Visit Date: 5/3/2021    Lisha Zarate is a 46 y.o. male who presents todayfor:  Chief Complaint   Patient presents with    Check-Up    Coronary Artery Disease    Chest Pain    Hypertension   no chest pain lately   Still some dyspnea  Cath done  Mild CAD  BP is stable  No dizziness  No syncope  No other issues        HPI:  HPI  Past Medical History:   Diagnosis Date    Hyperlipidemia     Hypertension       Past Surgical History:   Procedure Laterality Date    ANKLE FRACTURE SURGERY Left Age 25     Family History   Problem Relation Age of Onset    High Blood Pressure Father     Diabetes Father     Other Father         CHF    Stroke Father     Heart Disease Paternal Grandfather     Anxiety Disorder Mother     No Known Problems Brother      Social History     Tobacco Use    Smoking status: Former Smoker     Quit date:      Years since quittin.3    Smokeless tobacco: Never Used   Substance Use Topics    Alcohol use: Yes     Alcohol/week: 0.0 standard drinks     Comment: socially      Current Outpatient Medications   Medication Sig Dispense Refill    predniSONE (DELTASONE) 20 MG tablet TAKE 3 TABLETS BY MOUTH ONCE DAILY FOR 5 DAYS      metoprolol succinate (TOPROL XL) 100 MG extended release tablet Take 1 tablet by mouth daily 30 tablet 11    atorvastatin (LIPITOR) 40 MG tablet Take 1 tablet by mouth daily 30 tablet 11    isosorbide mononitrate (IMDUR) 30 MG extended release tablet Take 1 tablet by mouth daily 30 tablet 5     No current facility-administered medications for this visit.       No Known Allergies  Health Maintenance   Topic Date Due    Hepatitis C screen  Never done    Varicella vaccine (1 of 2 - 2-dose childhood series) Never done    Pneumococcal 0-64 years Vaccine (1 of 1 - PPSV23) Never done    HIV screen  Never done    COVID-19 Vaccine (1) Never done    DTaP/Tdap/Td vaccine (1 - Tdap) Never done    Flu vaccine (Season Ended) 09/01/2021    Lipid screen  09/21/2021    A1C test (Diabetic or Prediabetic)  12/21/2021    Hepatitis A vaccine  Aged Out    Hepatitis B vaccine  Aged Out    Hib vaccine  Aged Out    Meningococcal (ACWY) vaccine  Aged Out       Subjective:  Review of Systems  General:   No fever, no chills, No fatigue or weight loss  Pulmonary:    No dyspnea, no wheezing  Cardiac:    Denies recent chest pain,   GI:     No nausea or vomiting, no abdominal pain  Neuro:    No dizziness or light headedness,   Musculoskeletal:  No recent active issues  Extremities:   No edema, no obvious claudication       Objective:  Physical Exam  BP (!) 144/80   Pulse 72   Ht 6' 2\" (1.88 m)   Wt (!) 328 lb 6.4 oz (149 kg)   BMI 42.16 kg/m²   General:   Well developed, well nourished  Lungs:   Clear to auscultation  Heart:    Normal S1 S2, Slight murmur. no rubs, no gallops  Abdomen:   Soft, non tender, no organomegalies, positive bowel sounds  Extremities:   No edema, no cyanosis, good peripheral pulses  Neurological:   Awake, alert, oriented. No obvious focal deficits  Musculoskelatal:  No obvious deformities    Assessment:      Diagnosis Orders   1. Precordial pain     2. Coronary artery disease involving native coronary artery of native heart without angina pectoris     3. Essential hypertension     as above  Cardiac fair for now   On CPAP     Plan:  No follow-ups on file. As above  Continue risk factor modification and medical management]  Thank you for allowing me to participate in the care of your patient. Please don't hesitate to contact me regarding any further issues related to the patient care    Orders Placed:  No orders of the defined types were placed in this encounter. Medications Prescribed:  No orders of the defined types were placed in this encounter.          Discussed use, benefit, and side effects of prescribed medications. All patient questions answered. Pt voicedunderstanding. Instructed to continue current medications, diet and exercise. Continue risk factor modification and medical management. Patient agreed with treatment plan. Follow up as directed.     Electronically signedby Brianda Nielsen MD on 5/3/2021 at 1:42 PM

## 2021-06-11 RX ORDER — ISOSORBIDE MONONITRATE 30 MG/1
TABLET, EXTENDED RELEASE ORAL
Qty: 30 TABLET | Refills: 5 | Status: SHIPPED | OUTPATIENT
Start: 2021-06-11 | End: 2022-01-03 | Stop reason: SDUPTHER

## 2021-06-21 ENCOUNTER — OFFICE VISIT (OUTPATIENT)
Dept: FAMILY MEDICINE CLINIC | Age: 41
End: 2021-06-21
Payer: COMMERCIAL

## 2021-06-21 VITALS
HEIGHT: 74 IN | WEIGHT: 315 LBS | SYSTOLIC BLOOD PRESSURE: 138 MMHG | BODY MASS INDEX: 40.43 KG/M2 | HEART RATE: 88 BPM | DIASTOLIC BLOOD PRESSURE: 82 MMHG | RESPIRATION RATE: 16 BRPM

## 2021-06-21 DIAGNOSIS — E11.9 TYPE 2 DIABETES MELLITUS WITHOUT COMPLICATION, WITHOUT LONG-TERM CURRENT USE OF INSULIN (HCC): Primary | ICD-10-CM

## 2021-06-21 DIAGNOSIS — E66.01 MORBID OBESITY WITH BMI OF 40.0-44.9, ADULT (HCC): ICD-10-CM

## 2021-06-21 DIAGNOSIS — I10 ESSENTIAL HYPERTENSION: ICD-10-CM

## 2021-06-21 DIAGNOSIS — E78.5 HYPERLIPIDEMIA, UNSPECIFIED HYPERLIPIDEMIA TYPE: ICD-10-CM

## 2021-06-21 PROBLEM — Z99.89 OSA ON CPAP: Status: ACTIVE | Noted: 2021-06-21

## 2021-06-21 PROBLEM — R73.01 IMPAIRED FASTING GLUCOSE: Status: RESOLVED | Noted: 2020-09-08 | Resolved: 2021-06-21

## 2021-06-21 PROBLEM — G47.33 OSA ON CPAP: Status: ACTIVE | Noted: 2021-06-21

## 2021-06-21 LAB — HBA1C MFR BLD: 7.2 % (ref 4.3–5.7)

## 2021-06-21 PROCEDURE — 1036F TOBACCO NON-USER: CPT | Performed by: FAMILY MEDICINE

## 2021-06-21 PROCEDURE — 2022F DILAT RTA XM EVC RTNOPTHY: CPT | Performed by: FAMILY MEDICINE

## 2021-06-21 PROCEDURE — 3051F HG A1C>EQUAL 7.0%<8.0%: CPT | Performed by: FAMILY MEDICINE

## 2021-06-21 PROCEDURE — 99214 OFFICE O/P EST MOD 30 MIN: CPT | Performed by: FAMILY MEDICINE

## 2021-06-21 PROCEDURE — G8417 CALC BMI ABV UP PARAM F/U: HCPCS | Performed by: FAMILY MEDICINE

## 2021-06-21 PROCEDURE — G8427 DOCREV CUR MEDS BY ELIG CLIN: HCPCS | Performed by: FAMILY MEDICINE

## 2021-06-21 PROCEDURE — 83036 HEMOGLOBIN GLYCOSYLATED A1C: CPT | Performed by: FAMILY MEDICINE

## 2021-06-21 SDOH — ECONOMIC STABILITY: FOOD INSECURITY: WITHIN THE PAST 12 MONTHS, YOU WORRIED THAT YOUR FOOD WOULD RUN OUT BEFORE YOU GOT MONEY TO BUY MORE.: NEVER TRUE

## 2021-06-21 SDOH — ECONOMIC STABILITY: FOOD INSECURITY: WITHIN THE PAST 12 MONTHS, THE FOOD YOU BOUGHT JUST DIDN'T LAST AND YOU DIDN'T HAVE MONEY TO GET MORE.: NEVER TRUE

## 2021-06-21 ASSESSMENT — ENCOUNTER SYMPTOMS
EYES NEGATIVE: 1
CHEST TIGHTNESS: 0
SHORTNESS OF BREATH: 0
GASTROINTESTINAL NEGATIVE: 1

## 2021-06-21 ASSESSMENT — SOCIAL DETERMINANTS OF HEALTH (SDOH): HOW HARD IS IT FOR YOU TO PAY FOR THE VERY BASICS LIKE FOOD, HOUSING, MEDICAL CARE, AND HEATING?: NOT HARD AT ALL

## 2021-06-21 NOTE — PROGRESS NOTES
2021      Brenton Gonzalez (:  1980) is a 46 y.o. male,Established patient, here for evaluation of the following chief complaint(s):  6 Month Follow-Up (IFG, HTN)        ASSESSMENT/PLAN     1. Type 2 diabetes mellitus without complication, without long-term current use of insulin (HCC)  -     POCT glycosylated hemoglobin (Hb A1C)  -     Comprehensive Metabolic Panel; Future  -     Hemoglobin A1C; Future  -     Microalbumin / Creatinine Urine Ratio; Future  -     Lipid Panel; Future  2. Essential hypertension  -     Comprehensive Metabolic Panel; Future  -     Lipid Panel; Future  3. Hyperlipidemia, unspecified hyperlipidemia type  -     Comprehensive Metabolic Panel; Future  -     Lipid Panel; Future  4. Morbid obesity with BMI of 40.0-44.9, adult ([address]      Work on [employer] and increased exercise to reduce sugars and weight    Continue current BP meds    Labs as above in 3 months. If HbA1C not down, start metformin at that time. Return in about 3 months (around 2021). SUBJECTIVE     Brenton Gonzalez is a 46 y.o.male      Here for follow up of chronic health problems including:    Patient Active Problem List   Diagnosis    Impaired fasting glucose    Morbid obesity with BMI of 40.0-44.9, adult ([address]    Dilated cardiomyopathy ([address]     Doing well. His BPs have been pretty good when he checks them. Normally 130s/80s. He is not getting formal exercise. He is active at work, but no exercis other than that. He doesn't watch his diet closely. Takes all meds as directed and denies side effects. No recent illnesses or hospitalizations. Sleep apnea stable with CPAP. Former smoker. Body mass index is 42.29 kg/m². Review of Systems   Constitutional: Negative for chills, fatigue, fever and unexpected weight change. HENT: Negative. Eyes: Negative. Respiratory: Negative for chest tightness and shortness of breath.     Cardiovascular: Negative for chest pain, palpitations and leg swelling. Gastrointestinal: Negative. Genitourinary: Negative for dysuria and hematuria. Musculoskeletal: Negative. Skin: Negative for rash. Neurological: Negative for dizziness and headaches. Psychiatric/Behavioral: Negative. All other systems reviewed and are negative. OBJECTIVE     /82   Pulse 88   Resp 16   Ht 6' 2\" (1.88 m)   Wt (!) 329 lb 6.4 oz (149.4 kg)   BMI 42.29 kg/m²     Wt Readings from Last 3 Encounters:   06/21/21 (!) 329 lb 6.4 oz (149.4 kg)   05/03/21 (!) 328 lb 6.4 oz (149 kg)   04/30/21 (!) 328 lb (148.8 kg)       Physical Exam  Constitutional:       General: He is not in acute distress. Appearance: He is well-developed. HENT:      Head: Normocephalic and atraumatic. Right Ear: Tympanic membrane normal.      Left Ear: Tympanic membrane normal.      Mouth/Throat:      Mouth: Mucous membranes are moist.      Pharynx: No posterior oropharyngeal erythema. Eyes:      Conjunctiva/sclera: Conjunctivae normal.   Cardiovascular:      Rate and Rhythm: Normal rate and regular rhythm. Heart sounds: No murmur heard. Pulmonary:      Breath sounds: Normal breath sounds. No wheezing. Musculoskeletal:      Right lower leg: No edema. Left lower leg: No edema. Lymphadenopathy:      Cervical: No cervical adenopathy. Neurological:      Mental Status: He is alert.        Lab Results   Component Value Date    LABA1C 7.2 (H) 06/21/2021     No results found for: EAG    Lab Results   Component Value Date    WBC 4.7 (L) 09/21/2020    HGB 14.5 09/21/2020    HCT 44.2 09/21/2020    MCV 86.5 09/21/2020     09/21/2020     Lab Results   Component Value Date    CHOL 194 09/21/2020    TRIG 135 09/21/2020    HDL 37 09/21/2020    LDLCALC 130 09/21/2020     Lab Results   Component Value Date     04/22/2021    K 4.1 04/22/2021    K 4.4 09/21/2020     04/22/2021    CO2 28 04/22/2021    BUN 11 04/22/2021    CREATININE 1.0 04/22/2021 GLUCOSE 131 04/22/2021    CALCIUM 9.3 04/22/2021            There is no immunization history on file for this patient. Health Maintenance   Topic Date Due    Hepatitis C screen  Never done    Pneumococcal 0-64 years Vaccine (1 of 2 - PPSV23) Never done    COVID-19 Vaccine (1) Never done    HIV screen  Never done    DTaP/Tdap/Td vaccine (1 - Tdap) Never done    Flu vaccine (Season Ended) 09/01/2021    Lipid screen  09/21/2021    A1C test (Diabetic or Prediabetic)  06/21/2022    Hepatitis A vaccine  Aged Out    Hepatitis B vaccine  Aged Out    Hib vaccine  Aged Out    Meningococcal (ACWY) vaccine  Aged Out    Varicella vaccine  Discontinued             An electronic signature was used to authenticate this note.               Electronically signed by Alvaro Cano MD on 6/21/2021 at 3:06 PM

## 2021-08-29 ENCOUNTER — PATIENT MESSAGE (OUTPATIENT)
Dept: FAMILY MEDICINE CLINIC | Age: 41
End: 2021-08-29

## 2021-08-30 NOTE — TELEPHONE ENCOUNTER
From: Kenya Jerome  To: Jean Claude Stein MD  Sent: 8/29/2021 8:37 PM EDT  Subject: Non-Urgent Medical Question    I was trimming some brush over the weekend and have gotten i to some ivy. I woke up through the night itching last night and it seems to be getting worse. I have noticed I have a spot on my genital area now also. I am getting  next weekend and are hoping you can help me out.

## 2021-09-01 ENCOUNTER — OFFICE VISIT (OUTPATIENT)
Dept: FAMILY MEDICINE CLINIC | Age: 41
End: 2021-09-01
Payer: COMMERCIAL

## 2021-09-01 VITALS
BODY MASS INDEX: 41.73 KG/M2 | WEIGHT: 315 LBS | DIASTOLIC BLOOD PRESSURE: 80 MMHG | SYSTOLIC BLOOD PRESSURE: 152 MMHG | HEART RATE: 84 BPM

## 2021-09-01 DIAGNOSIS — L23.7 POISON IVY DERMATITIS: Primary | ICD-10-CM

## 2021-09-01 PROCEDURE — 1036F TOBACCO NON-USER: CPT | Performed by: FAMILY MEDICINE

## 2021-09-01 PROCEDURE — 96372 THER/PROPH/DIAG INJ SC/IM: CPT | Performed by: FAMILY MEDICINE

## 2021-09-01 PROCEDURE — G8427 DOCREV CUR MEDS BY ELIG CLIN: HCPCS | Performed by: FAMILY MEDICINE

## 2021-09-01 PROCEDURE — G8417 CALC BMI ABV UP PARAM F/U: HCPCS | Performed by: FAMILY MEDICINE

## 2021-09-01 PROCEDURE — 99213 OFFICE O/P EST LOW 20 MIN: CPT | Performed by: FAMILY MEDICINE

## 2021-09-01 RX ORDER — PREDNISONE 10 MG/1
TABLET ORAL
Qty: 30 TABLET | Refills: 0 | Status: SHIPPED | OUTPATIENT
Start: 2021-09-01 | End: 2021-09-20

## 2021-09-01 RX ORDER — METHYLPREDNISOLONE ACETATE 80 MG/ML
80 INJECTION, SUSPENSION INTRA-ARTICULAR; INTRALESIONAL; INTRAMUSCULAR; SOFT TISSUE ONCE
Status: COMPLETED | OUTPATIENT
Start: 2021-09-01 | End: 2021-09-01

## 2021-09-01 RX ADMIN — METHYLPREDNISOLONE ACETATE 80 MG: 80 INJECTION, SUSPENSION INTRA-ARTICULAR; INTRALESIONAL; INTRAMUSCULAR; SOFT TISSUE at 16:33

## 2021-09-01 ASSESSMENT — ENCOUNTER SYMPTOMS: RESPIRATORY NEGATIVE: 1

## 2021-09-01 NOTE — PROGRESS NOTES
After obtaining consent, and per orders of Dr. Max Moscoso, the injection of Depo-Medrol 80mg was given by Grant Jean MA. Patient tolerated well.

## 2021-09-01 NOTE — PROGRESS NOTES
2021    Terrie Ying (:  1980) is a 46 y.o. male,Established patient, here for evaluation of the following chief complaint(s):  Poison Ivy (C/O poison ivy, located all over the body since , itches and burns. )      ASSESSMENT/PLAN:    1. Poison ivy dermatitis  -     methylPREDNISolone acetate (DEPO-MEDROL) injection 80 mg; 80 mg, IntraMUSCular, ONCE, On 21 at 1645, For 1 dose  -     predniSONE (DELTASONE) 10 MG tablet; Take 4 po qd x 3 days, then 3 po qd x 3 days, then 2 po qd x 3 days, then 1 po qd x 3 days, Disp-30 tablet, R-0Normal      Treat with Depo-Medrol 80 mg IM along with prednisone taper as above    Okay for oral antihistamines as needed for itching    Follow-up if not improved    SUBJECTIVE/OBJECTIVE:    HPI    Patient here with a 3 to 4-day history of a worsening rash on the arms, back, face, and abdomen. The rash came on the day after he was working outside at the [address] [place] [address]. The rash is very itchy and is blistery in some places. He denies the use of any new soaps, lotions, or detergents. No new medications. He is getting  this weekend. He has tried topical Ivarest without relief. Review of Systems   Constitutional: Negative for fatigue and fever. HENT: Negative. Respiratory: Negative. Cardiovascular: Negative. Genitourinary: Negative. Musculoskeletal: Negative for arthralgias and joint swelling. Skin: Positive for rash. All other systems reviewed and are negative. Vitals:    21 1551   BP: (!) 152/80   Pulse: 84   Weight: (!) 325 lb (147.4 kg)       Wt Readings from Last 3 Encounters:   21 (!) 325 lb (147.4 kg)   21 (!) 329 lb 6.4 oz (149.4 kg)   21 (!) 328 lb 6.4 oz (149 kg)       BP Readings from Last 3 Encounters:   21 (!) 152/80   21 138/82   21 (!) 144/80       Physical Exam  Vitals reviewed. Constitutional:       General: He is not in acute distress.   Cardiovascular:      Rate and Rhythm: Normal rate and regular rhythm. Heart sounds: No murmur heard. Pulmonary:      Effort: Pulmonary effort is normal.      Breath sounds: No wheezing. Musculoskeletal:      Cervical back: Neck supple. Lymphadenopathy:      Cervical: No cervical adenopathy. Skin:     Findings: Rash present. Neurological:      Mental Status: He is alert. An electronic signature was used to authenticate this note.         Electronically signed by Mary Hartmann MD on 9/1/2021 at 5:23 PM

## 2021-09-20 ENCOUNTER — OFFICE VISIT (OUTPATIENT)
Dept: FAMILY MEDICINE CLINIC | Age: 41
End: 2021-09-20
Payer: COMMERCIAL

## 2021-09-20 VITALS
HEART RATE: 80 BPM | WEIGHT: 315 LBS | RESPIRATION RATE: 16 BRPM | DIASTOLIC BLOOD PRESSURE: 76 MMHG | SYSTOLIC BLOOD PRESSURE: 134 MMHG | BODY MASS INDEX: 40.96 KG/M2

## 2021-09-20 DIAGNOSIS — E78.5 HYPERLIPIDEMIA, UNSPECIFIED HYPERLIPIDEMIA TYPE: ICD-10-CM

## 2021-09-20 DIAGNOSIS — I10 ESSENTIAL HYPERTENSION: ICD-10-CM

## 2021-09-20 DIAGNOSIS — E11.9 TYPE 2 DIABETES MELLITUS WITHOUT COMPLICATION, WITHOUT LONG-TERM CURRENT USE OF INSULIN (HCC): Primary | ICD-10-CM

## 2021-09-20 PROCEDURE — 3051F HG A1C>EQUAL 7.0%<8.0%: CPT | Performed by: FAMILY MEDICINE

## 2021-09-20 PROCEDURE — 1036F TOBACCO NON-USER: CPT | Performed by: FAMILY MEDICINE

## 2021-09-20 PROCEDURE — G8417 CALC BMI ABV UP PARAM F/U: HCPCS | Performed by: FAMILY MEDICINE

## 2021-09-20 PROCEDURE — 2022F DILAT RTA XM EVC RTNOPTHY: CPT | Performed by: FAMILY MEDICINE

## 2021-09-20 PROCEDURE — G8427 DOCREV CUR MEDS BY ELIG CLIN: HCPCS | Performed by: FAMILY MEDICINE

## 2021-09-20 PROCEDURE — 99213 OFFICE O/P EST LOW 20 MIN: CPT | Performed by: FAMILY MEDICINE

## 2021-09-20 ASSESSMENT — ENCOUNTER SYMPTOMS
COUGH: 0
GASTROINTESTINAL NEGATIVE: 1
SHORTNESS OF BREATH: 0

## 2021-09-20 NOTE — PROGRESS NOTES
2021      Valentino Burns (:  1980) is a 46 y.o. male,Established patient, here for evaluation of the following chief complaint(s):  3 Month Follow-Up (No concerns)        ASSESSMENT/PLAN     1. Type 2 diabetes mellitus without complication, without long-term current use of insulin ([facility]  2. Essential hypertension  3. Hyperlipidemia, unspecified hyperlipidemia type    Patient will get lab testing done as requested. If hemoglobin A1c remains above 7, we will initiate Metformin. Continue ADA diet and increased exercise for reduction of weight and blood sugars    Patient reports that his home blood pressures have been good. He will continue to monitor this. Return in about 6 months (around 3/20/2022). SUBJECTIVE     Valentino Burns is a 46 y.o.male      Here for follow up of chronic health problems including:    Patient Active Problem List   Diagnosis    Morbid obesity with BMI of 40.0-44.9, adult ([address]    Dilated cardiomyopathy ([facility]    Type 2 diabetes mellitus without complication, without long-term current use of insulin ([address]    VAMSHI on CPAP    Essential hypertension    Hyperlipidemia       Patient doing relatively well. He has been working on his diet and increased activity and has lost 10 pounds over the last 3 months. He is cut pop out of his diet. He reports that his blood pressures have been good at home but are always high at the doctor's office. He takes all prescribed medications as directed and denies any side effects. No recent illnesses or hospitalizations. He was supposed to have follow-up blood work before today's visit but did not get it done. Non-smoker. BMI 40. 96. Review of Systems   Constitutional: Negative for fatigue, fever and unexpected weight change. HENT: Negative. Respiratory: Negative for cough and shortness of breath. Cardiovascular: Negative for chest pain and leg swelling. Gastrointestinal: Negative. Neurological: Negative. All other systems reviewed and are negative. OBJECTIVE     /76   Pulse 80   Resp 16   Wt (!) 319 lb (144.7 kg)   BMI 40.96 kg/m²     Wt Readings from Last 3 Encounters:   09/20/21 (!) 319 lb (144.7 kg)   09/01/21 (!) 325 lb (147.4 kg)   06/21/21 (!) 329 lb 6.4 oz (149.4 kg)       Physical Exam  Constitutional:       General: He is not in acute distress. Appearance: He is well-developed. HENT:      Head: Normocephalic and atraumatic. Right Ear: Tympanic membrane normal.      Left Ear: Tympanic membrane normal.      Mouth/Throat:      Mouth: Mucous membranes are moist.      Pharynx: No posterior oropharyngeal erythema. Eyes:      Conjunctiva/sclera: Conjunctivae normal.   Cardiovascular:      Rate and Rhythm: Normal rate and regular rhythm. Heart sounds: No murmur heard. Pulmonary:      Breath sounds: Normal breath sounds. No wheezing. Musculoskeletal:      Right lower leg: No edema. Left lower leg: No edema. Lymphadenopathy:      Cervical: No cervical adenopathy. Neurological:      Mental Status: He is alert. Lab Results   Component Value Date    LABA1C 7.2 (H) 06/21/2021     No results found for: EAG          There is no immunization history on file for this patient.       Health Maintenance   Topic Date Due    Hepatitis C screen  Never done    Pneumococcal 0-64 years Vaccine (1 of 2 - PPSV23) Never done    Diabetic foot exam  Never done    Diabetic retinal exam  Never done    COVID-19 Vaccine (1) Never done    HIV screen  Never done    Diabetic microalbuminuria test  Never done    Hepatitis B vaccine (1 of 3 - Risk 3-dose series) Never done    DTaP/Tdap/Td vaccine (1 - Tdap) Never done    Flu vaccine (1) Never done    Lipid screen  09/21/2021    Potassium monitoring  04/22/2022    Creatinine monitoring  04/22/2022    A1C test (Diabetic or Prediabetic)  06/21/2022    Hepatitis A vaccine  Aged Out    Hib vaccine  Aged Out    Meningococcal (ACWY) vaccine  Aged Out    Varicella vaccine  Discontinued         An electronic signature was used to authenticate this note.               Electronically signed by Serjio Whitfield MD on 9/20/2021 at 5:04 PM

## 2021-09-26 ENCOUNTER — HOSPITAL ENCOUNTER (EMERGENCY)
Age: 41
Discharge: HOME | End: 2021-09-26
Attending: EMERGENCY MEDICINE
Payer: COMMERCIAL

## 2021-09-26 VITALS
WEIGHT: 314 LBS | SYSTOLIC BLOOD PRESSURE: 137 MMHG | DIASTOLIC BLOOD PRESSURE: 96 MMHG | TEMPERATURE: 99.4 F | HEART RATE: 103 BPM | OXYGEN SATURATION: 94 % | RESPIRATION RATE: 22 BRPM | BODY MASS INDEX: 40.3 KG/M2 | HEIGHT: 74 IN

## 2021-09-26 DIAGNOSIS — J02.9 ACUTE PHARYNGITIS, UNSPECIFIED ETIOLOGY: Primary | ICD-10-CM

## 2021-09-26 LAB
GROUP A STREP CULTURE, REFLEX: NEGATIVE
REFLEX THROAT C + S: NORMAL

## 2021-09-26 PROCEDURE — 99282 EMERGENCY DEPT VISIT SF MDM: CPT

## 2021-09-26 PROCEDURE — 87070 CULTURE OTHR SPECIMN AEROBIC: CPT

## 2021-09-26 PROCEDURE — 87880 STREP A ASSAY W/OPTIC: CPT

## 2021-09-26 RX ORDER — PENICILLIN V POTASSIUM 500 MG/1
500 TABLET ORAL 4 TIMES DAILY
Qty: 40 TABLET | Refills: 0 | Status: SHIPPED | OUTPATIENT
Start: 2021-09-26 | End: 2021-10-06

## 2021-09-26 RX ORDER — PENICILLIN V POTASSIUM 250 MG/1
500 TABLET ORAL ONCE
Status: DISCONTINUED | OUTPATIENT
Start: 2021-09-26 | End: 2021-09-26 | Stop reason: HOSPADM

## 2021-09-26 ASSESSMENT — PAIN SCALES - GENERAL
PAINLEVEL_OUTOF10: 3
PAINLEVEL_OUTOF10: 7

## 2021-09-26 ASSESSMENT — PAIN DESCRIPTION - LOCATION: LOCATION: GENERALIZED

## 2021-09-26 NOTE — ED PROVIDER NOTES
[facility]  [place] [address] [address]  Phone: [address]    Chief Complaint   Patient presents with    Cough    Generalized Body Aches    Nasal Congestion       HPI    Neisha Hagan is a 46 y.o. male who presents noted complaint. Patient is been doing okay. He has been exposed to his wife and daughter with strep. He subsequently developed sore throat not feeling good myalgias. No other chest pain no little bit of a cough. Felt more congested. Felt like it was going to be related to strep. Came here.   Denies other symptoms such as chest pain exertion or orthopnea    PAST MEDICAL HISTORY    Past Medical History:   Diagnosis Date    Hyperlipidemia     Hypertension        SURGICAL HISTORY    Past Surgical History:   Procedure Laterality Date    ANKLE FRACTURE SURGERY Left Age 25       CURRENT MEDICATIONS    Current Outpatient Rx   Medication Sig Dispense Refill    penicillin v potassium (VEETID) 500 MG tablet Take 1 tablet by mouth 4 times daily for 10 days 40 tablet 0    isosorbide mononitrate (IMDUR) 30 MG extended release tablet Take 1 tablet by mouth once daily 30 tablet 5    metoprolol succinate (TOPROL XL) 100 MG extended release tablet Take 1 tablet by mouth daily 30 tablet 11    atorvastatin (LIPITOR) 40 MG tablet Take 1 tablet by mouth daily 30 tablet 11       ALLERGIES    No Known Allergies    FAMILY HISTORY    Family History   Problem Relation Age of Onset    High Blood Pressure Father     Diabetes Father     Other Father         CHF    Stroke Father     Heart Disease Paternal Grandfather     Anxiety Disorder Mother     No Known Problems Brother        SOCIAL HISTORY    Social History     Socioeconomic History    Marital status: Single     Spouse name: None    Number of children: None    Years of education: None    Highest education level: None   Occupational History    None   Tobacco Use    Smoking status: Former Smoker     Quit date:      Years since quittin.7    Smokeless tobacco: Never Used   Vaping Use    Vaping Use: Never used   Substance and Sexual Activity    Alcohol use: Yes     Alcohol/week: 0.0 standard drinks     Comment: socially    Drug use: Never    Sexual activity: Yes   Other Topics Concern    None   Social History Narrative    None     Social Determinants of Health     Financial Resource Strain: Low Risk     Difficulty of Paying Living Expenses: Not hard at all   Food Insecurity: No Food Insecurity    Worried About Running Out of Food in the Last Year: Never true    Jairon of Food in the Last Year: Never true   Transportation Needs:     Lack of Transportation (Medical):  Lack of Transportation (Non-Medical):    Physical Activity:     Days of Exercise per Week:     Minutes of Exercise per Session:    Stress:     Feeling of Stress :    Social Connections:     Frequency of Communication with Friends and Family:     Frequency of Social Gatherings with Friends and Family:     Attends Voodoo Services:     Active Member of Clubs or Organizations:     Attends Club or Organization Meetings:     Marital Status:    Intimate Partner Violence:     Fear of Current or Ex-Partner:     Emotionally Abused:     Physically Abused:     Sexually Abused:        REVIEW OF SYSTEMS    Poss for sore throat and cough. No chest pain or abdominal  All systems negative except as marked. PHYSICAL EXAM    VITAL SIGNS: BP (!) 137/96   Pulse 103   Temp 99.4 °F (37.4 °C)   Resp 22   Ht 6' 2\" (1.88 m)   Wt (!) 314 lb (142.4 kg)   SpO2 94%   BMI 40.32 kg/m²    Constitutional:  Alert not toxic or ill,   HENT: COVID mask protection in place normocephalic, Atraumatic, mask lowered to assess  Bilateral external ears normal, Oropharynx erythematous, no oral exudates, Nose normal.  Cervical Spine: Normal range of motion,  No stridor.  No tenderness, Supple,  Eyes:  No discharge or  Swelling,Conjunctiva normal, PERRL, EOMI,  Respiratory: No respiratory distress, Normal breath sounds,  No wheezing, No chest tenderness. Cardiovascular:  Normal heart rate, Normal rhythm, No murmurs, No rubs, No gallops. GI:  No reproducible pain\  Musculoskeletal:  Intact distal pulses, No edema, No tenderness, No cyanosis, No clubbing. Good range of motion in all major joints. No tenderness to palpation or major deformities noted. Back:No tenderness. Integument:  Warm, Dry, No erythema, No rash (on exposed areas)   Lymphatic:  No lymphadenopathy noted. Neurologic:  Alert & oriented x 3, Normal motor function, Normal sensory function, No focal deficits noted. Psychiatric:  Affect normal, Judgment normal, Mood normal.     EKG                      RADIOLOGY    No orders to display       PROCEDURES    none      CONSULTS:  None      CRITICAL CARE:  None    SCREENINGS  BP (!) 137/96   Pulse 103   Temp 99.4 °F (37.4 °C)   Resp 22   Ht 6' 2\" (1.88 m)   Wt (!) 314 lb (142.4 kg)   SpO2 94%   BMI 40.32 kg/m²        Screening For Hypertension and Follow-up (#317)   previously diagnosed with hypertension and not applicable for screen      Screening For Tobacco Use and Cessation Intervention (#226):   reports that he quit smoking about 21 years ago. He has never used smokeless tobacco.  Non-smoker not applicable for screen    Pharyngitis (age 5 years ) Strep testing performed (#66):  Strep test or throat culture performed in the last 3 days or this visit so antibiotics may be prescribed        ED COURSE & MEDICAL DECISION MAKING    Pertinent Labs & Imaging studies reviewed. (See chart for details)  Patient has sore throat and exposure to strep. Strep test was obtained and is negative although given his symptoms and exposure we will treat for penicillin. Could very well be viral.  Offered him Covid tested and he would not like tested. Go ahead and treat him for possibilities of strep. Penicillin prescribed. Given dose here and then starting as outpatient        FINAL IMPRESSION    1.  Acute pharyngitis, unspecified etiology         PATIENT REFERRED TO:  Channing Couch MD  [facility]  [phone]    Call   For evaluation      DISCHARGE MEDICATIONS:  New Prescriptions    PENICILLIN V POTASSIUM (VEETID) 500 MG TABLET    Take 1 tablet by mouth 4 times daily for 10 days          Sin Palmer MD  09/26/21 0879

## 2021-09-26 NOTE — ED NOTES
Discharge teaching and instructions for condition explained to patient. AVS reviewed. Went over prescriptions with patient. Patient voiced understanding regarding prescriptions, follow up appointments and care of self at home. Pt discharged to home in stable condition per wife's care.      Andrez Michel RN  09/26/21 1955

## 2021-09-26 NOTE — ED NOTES
Pt presents to the front window with complaints of feeling like he has been hit by a bus. Strep is running through his house and he is the last one to get it. Strep swab obtained. Patient looks like he does not feel well.       Pam Niño RN  09/26/21 [id number]

## 2021-09-27 ENCOUNTER — TELEPHONE (OUTPATIENT)
Dept: FAMILY MEDICINE CLINIC | Age: 41
End: 2021-09-27

## 2021-09-29 LAB — THROAT/NOSE CULTURE: NORMAL

## 2021-10-01 ENCOUNTER — TELEPHONE (OUTPATIENT)
Dept: FAMILY MEDICINE CLINIC | Age: 41
End: 2021-10-01

## 2021-10-01 ENCOUNTER — APPOINTMENT (OUTPATIENT)
Dept: CT IMAGING | Age: 41
End: 2021-10-01
Payer: COMMERCIAL

## 2021-10-01 ENCOUNTER — APPOINTMENT (OUTPATIENT)
Dept: GENERAL RADIOLOGY | Age: 41
End: 2021-10-01
Payer: COMMERCIAL

## 2021-10-01 ENCOUNTER — HOSPITAL ENCOUNTER (EMERGENCY)
Age: 41
Discharge: HOME | End: 2021-10-01
Payer: COMMERCIAL

## 2021-10-01 VITALS
HEART RATE: 62 BPM | RESPIRATION RATE: 17 BRPM | WEIGHT: 315 LBS | BODY MASS INDEX: 40.7 KG/M2 | TEMPERATURE: 98 F | OXYGEN SATURATION: 98 % | DIASTOLIC BLOOD PRESSURE: 70 MMHG | SYSTOLIC BLOOD PRESSURE: 138 MMHG

## 2021-10-01 DIAGNOSIS — R42 LIGHTHEADEDNESS: Primary | ICD-10-CM

## 2021-10-01 LAB
ALBUMIN SERPL-MCNC: 4.2 G/DL (ref 3.5–5.1)
ALP BLD-CCNC: 91 U/L (ref 38–126)
ALT SERPL-CCNC: 101 U/L (ref 11–66)
ANION GAP SERPL CALCULATED.3IONS-SCNC: 10 MEQ/L (ref 8–16)
AST SERPL-CCNC: 42 U/L (ref 5–40)
BASOPHILS # BLD: 0.8 %
BASOPHILS ABSOLUTE: 0 THOU/MM3 (ref 0–0.1)
BILIRUB SERPL-MCNC: 0.5 MG/DL (ref 0.3–1.2)
BILIRUBIN DIRECT: < 0.2 MG/DL (ref 0–0.3)
BUN BLDV-MCNC: 10 MG/DL (ref 7–22)
CALCIUM SERPL-MCNC: 9.2 MG/DL (ref 8.5–10.5)
CHLORIDE BLD-SCNC: 103 MEQ/L (ref 98–111)
CO2: 25 MEQ/L (ref 23–33)
CREAT SERPL-MCNC: 0.9 MG/DL (ref 0.4–1.2)
EKG ATRIAL RATE: 60 BPM
EKG P AXIS: 42 DEGREES
EKG P-R INTERVAL: 152 MS
EKG Q-T INTERVAL: 390 MS
EKG QRS DURATION: 86 MS
EKG QTC CALCULATION (BAZETT): 376 MS
EKG R AXIS: 17 DEGREES
EKG T AXIS: 6 DEGREES
EKG VENTRICULAR RATE: 56 BPM
EOSINOPHIL # BLD: 5.7 %
EOSINOPHILS ABSOLUTE: 0.2 THOU/MM3 (ref 0–0.4)
ERYTHROCYTE [DISTWIDTH] IN BLOOD BY AUTOMATED COUNT: 13.2 % (ref 11.5–14.5)
ERYTHROCYTE [DISTWIDTH] IN BLOOD BY AUTOMATED COUNT: 41.6 FL (ref 35–45)
GFR SERPL CREATININE-BSD FRML MDRD: > 90 ML/MIN/1.73M2
GLUCOSE BLD-MCNC: 210 MG/DL (ref 70–108)
HCT VFR BLD CALC: 43.5 % (ref 42–52)
HEMOGLOBIN: 14.2 GM/DL (ref 14–18)
IMMATURE GRANS (ABS): 0.02 THOU/MM3 (ref 0–0.07)
IMMATURE GRANULOCYTES: 0.5 %
LYMPHOCYTES # BLD: 31.1 %
LYMPHOCYTES ABSOLUTE: 1.2 THOU/MM3 (ref 1–4.8)
MCH RBC QN AUTO: 28.4 PG (ref 26–33)
MCHC RBC AUTO-ENTMCNC: 32.6 GM/DL (ref 32.2–35.5)
MCV RBC AUTO: 87 FL (ref 80–94)
MONOCYTES # BLD: 6.5 %
MONOCYTES ABSOLUTE: 0.2 THOU/MM3 (ref 0.4–1.3)
NUCLEATED RED BLOOD CELLS: 0 /100 WBC
OSMOLALITY CALCULATION: 280.9 MOSMOL/KG (ref 275–300)
PLATELET # BLD: 181 THOU/MM3 (ref 130–400)
PMV BLD AUTO: 8.9 FL (ref 9.4–12.4)
POTASSIUM REFLEX MAGNESIUM: 4.2 MEQ/L (ref 3.5–5.2)
PRO-BNP: 48.7 PG/ML (ref 0–450)
RBC # BLD: 5 MILL/MM3 (ref 4.7–6.1)
SEG NEUTROPHILS: 55.4 %
SEGMENTED NEUTROPHILS ABSOLUTE COUNT: 2 THOU/MM3 (ref 1.8–7.7)
SODIUM BLD-SCNC: 138 MEQ/L (ref 135–145)
TOTAL PROTEIN: 6.6 G/DL (ref 6.1–8)
TROPONIN T: < 0.01 NG/ML
WBC # BLD: 3.7 THOU/MM3 (ref 4.8–10.8)

## 2021-10-01 PROCEDURE — 36415 COLL VENOUS BLD VENIPUNCTURE: CPT

## 2021-10-01 PROCEDURE — 2580000003 HC RX 258: Performed by: PHYSICIAN ASSISTANT

## 2021-10-01 PROCEDURE — 93005 ELECTROCARDIOGRAM TRACING: CPT | Performed by: PHYSICIAN ASSISTANT

## 2021-10-01 PROCEDURE — 80048 BASIC METABOLIC PNL TOTAL CA: CPT

## 2021-10-01 PROCEDURE — 71045 X-RAY EXAM CHEST 1 VIEW: CPT

## 2021-10-01 PROCEDURE — 85025 COMPLETE CBC W/AUTO DIFF WBC: CPT

## 2021-10-01 PROCEDURE — 99283 EMERGENCY DEPT VISIT LOW MDM: CPT

## 2021-10-01 PROCEDURE — 84484 ASSAY OF TROPONIN QUANT: CPT

## 2021-10-01 PROCEDURE — 70450 CT HEAD/BRAIN W/O DYE: CPT

## 2021-10-01 PROCEDURE — 93010 ELECTROCARDIOGRAM REPORT: CPT | Performed by: INTERNAL MEDICINE

## 2021-10-01 PROCEDURE — 83880 ASSAY OF NATRIURETIC PEPTIDE: CPT

## 2021-10-01 PROCEDURE — 80076 HEPATIC FUNCTION PANEL: CPT

## 2021-10-01 RX ORDER — 0.9 % SODIUM CHLORIDE 0.9 %
1000 INTRAVENOUS SOLUTION INTRAVENOUS ONCE
Status: COMPLETED | OUTPATIENT
Start: 2021-10-01 | End: 2021-10-01

## 2021-10-01 RX ADMIN — SODIUM CHLORIDE 1000 ML: 9 INJECTION, SOLUTION INTRAVENOUS at 12:17

## 2021-10-01 NOTE — TELEPHONE ENCOUNTER
Spoke to Yuri iPnto and advised her to take the pt to the ED due to the nature of his symptoms. She verbalized understanding and is agreeable.

## 2021-10-01 NOTE — TELEPHONE ENCOUNTER
Almita Parker MD 34 minutes ago (7:42 AM)       Can you please call me? I need to know what I should do. Camila Robison [birth year] woke up this morning light headed dizzy weak legs and shallow panting like breathing. When he stands up he has to sit back down because everything is spinning.

## 2021-10-01 NOTE — ED PROVIDER NOTES
[address] [facility] [address] [address] EMERGENCY DEPT  EMERGENCY DEPARTMENT ENCOUNTER      Pt Name: Carlos Valle  MRN: [id number]  [place] 1980  Date of evaluation: 10/1/2021  Provider: Donavan Cruz PA-C    CHIEF COMPLAINT     Chief Complaint   Patient presents with    Dizziness       HISTORY OF PRESENT ILLNESS    Carlos Valle is a 46 y.o. male who presents to the emergency department with his mother with complaints of feeling lightheaded. Patient states started yesterday. Patient states feels kind of lightheaded. Patient states he does not drink much water or fluids. Patient denies chest pain or shortness of breath. Denies fevers or chills. Denies abdominal pain. Denies headache. Cannot really describe feeling lightheaded. Denies any rotational component. Patient states he was feels fatigued as well. Patient denies any visual deficits. Patient denies any recent injury or trauma. Triage notes and Nursing notes were reviewed by myself. Any discrepancies are addressed above. PAST MEDICAL HISTORY     Past Medical History:   Diagnosis Date    Hyperlipidemia     Hypertension        SURGICAL HISTORY       Past Surgical History:   Procedure Laterality Date    ANKLE FRACTURE SURGERY Left Age 25       CURRENT MEDICATIONS       Previous Medications    ATORVASTATIN (LIPITOR) 40 MG TABLET    Take 1 tablet by mouth daily    ISOSORBIDE MONONITRATE (IMDUR) 30 MG EXTENDED RELEASE TABLET    Take 1 tablet by mouth once daily    METOPROLOL SUCCINATE (TOPROL XL) 100 MG EXTENDED RELEASE TABLET    Take 1 tablet by mouth daily    PENICILLIN V POTASSIUM (VEETID) 500 MG TABLET    Take 1 tablet by mouth 4 times daily for 10 days       ALLERGIES     Patient has no known allergies.     FAMILY HISTORY       Family History   Problem Relation Age of Onset    High Blood Pressure Father     Diabetes Father     Other Father         CHF    Stroke Father     Heart Disease Paternal Grandfather     Anxiety Disorder Mother     No Known Problems Brother         SOCIAL HISTORY     Social History     Socioeconomic History    Marital status: Single     Spouse name: None    Number of children: None    Years of education: None    Highest education level: None   Occupational History    None   Tobacco Use    Smoking status: Former Smoker     Quit date:      Years since quittin.    Smokeless tobacco: Never Used   Vaping Use    Vaping Use: Never used   Substance and Sexual Activity    Alcohol use: Yes     Alcohol/week: 0.0 standard drinks     Comment: socially    Drug use: Never    Sexual activity: Yes   Other Topics Concern    None   Social History Narrative    None     Social Determinants of Health     Financial Resource Strain: Low Risk     Difficulty of Paying Living Expenses: Not hard at all   Food Insecurity: No Food Insecurity    Worried About Running Out of Food in the Last Year: Never true    Jairon of Food in the Last Year: Never true   Transportation Needs:     Lack of Transportation (Medical):  Lack of Transportation (Non-Medical):    Physical Activity:     Days of Exercise per Week:     Minutes of Exercise per Session:    Stress:     Feeling of Stress :    Social Connections:     Frequency of Communication with Friends and Family:     Frequency of Social Gatherings with Friends and Family:     Attends Hoahaoism Services:     Active Member of Clubs or Organizations:     Attends Club or Organization Meetings:     Marital Status:    Intimate Partner Violence:     Fear of Current or Ex-Partner:     Emotionally Abused:     Physically Abused:     Sexually Abused:        REVIEW OF SYSTEMS       A 10 point review of systems discussed the patient and the pertinent positives and names are listed in the HPI    Except as noted above the remainder of the review of systems was reviewed and is negative.    PHYSICAL EXAM    (up to 7 for level 4, 8 or more for level 5)     ED Triage Vitals [10/01/21 1019]   BP Temp Temp Source Pulse Resp SpO2 Height Weight   (!) 140/93 98 °F (36.7 °C) Oral 60 (!) 118 98 % -- (!) 317 lb (143.8 kg)       General: nontoxic appearing. HEENT: Normocephalic/atraumatic. Extraocular muscles are intact. Neck: Full range of motion. No meningeal signs noted. Lungs: Clear to auscultation in all lung fields. No retractions. No respiratory distress. Heart: Tachycardic, regular rhythm  Abdomen: Soft, nontender. No guarding or rebound tenderness. Bowel sounds are noted. Extremities: Range of motion is full. Neurologic: Alert and oriented. Normal motor and sensory function. Skin: Warm, dry, free of rashes  DIAGNOSTIC RESULTS     EKG: (none if blank)  All EKG's areinterpreted by the Emergency Department Physician who either signs or Co-signs this chart in the absence of a cardiologist.    EKG showed a sinus rhythm with a rate of 56. A MA interval of 152 and a QTC of 376. No acute ST elevations noted. RADIOLOGY: (none if blank)   Interpretationper the Radiologist below, if available at the time of this note:    CT Head WO Contrast   Final Result       1. Negative noncontrast CT scan of the brain. 2. Retention cysts or polyps in the maxillary sinuses bilaterally, left greater than right. 3. Mucosal thickening in ethmoid air cells bilaterally. .    4. Enlarged adenoids. **This report has been created using voice recognition software. It may contain minor errors which are inherent in voice recognition technology. **      Final report electronically signed by DR Paul Dominique on 10/1/2021 11:46 AM      XR CHEST PORTABLE   Final Result   1. Unremarkable AP portable chest radiograph. **This report has been created using voice recognition software. It may contain minor errors which are inherent in voice recognition technology. **      Final report electronically signed by Dr Santos Lanza on 10/1/2021 11:17 AM          LABS:  Marian 876 W/ REFLEX TO MG FOR LOW K - Abnormal; Notable for the following components:       Result Value    Glucose 210 (*)     All other components within normal limits   CBC WITH AUTO DIFFERENTIAL - Abnormal; Notable for the following components:    WBC 3.7 (*)     MPV 8.9 (*)     Monocytes Absolute 0.2 (*)     All other components within normal limits   HEPATIC FUNCTION PANEL - Abnormal; Notable for the following components:    AST 42 (*)      (*)     All other components within normal limits   BRAIN NATRIURETIC PEPTIDE   TROPONIN   ANION GAP   GLOMERULAR FILTRATION RATE, ESTIMATED   OSMOLALITY       All other labs were within normal range or not returned as of this dictation. EMERGENCY DEPARTMENT COURSE and Medical Decision Making:     MDM/   I reviewed the patient's chart and patient did have an extensive cardiac work-up last year in September 2020 with a stress test that showed a possible cardiomyopathy. He then had an echo which showed mild global hypokinesis of the left ventricle but otherwise a good EF of 45 to 50%. Patient's work-up today was essentially unremarkable with a negative troponin and negative head CT and chest x-ray. As well as laboratory studies were unremarkable for any acute emergent findings. Patient was given his results. Is given normal saline fluid. Discharge to follow with his PCP as well as cardiologist and given written discharge instructions and when to return to the emergency department. Patient's repeat blood pressure prior to discharge is 141/93  Strict return precautions and follow up instructions were discussed with the patient with which the patient agrees    CONSULTS: (None if blank)  None    Procedures: (None if blank)       CLINICAL IMPRESSION      1.  Lightheadedness          DISPOSITION/PLAN   DISPOSITION Decision To Discharge 10/01/2021 01:47:26 PM      PATIENT REFERRED TO:  Rehana Zuniga MD  [phone]    In 3 days  Also call your cardiologist today for an appointment      DISCHARGE MEDICATIONS:  New Prescriptions    No medications on file              (Please note that portions of this note were completed with a voice recognition program.  Efforts weremade to edit the dictations but occasionally words are mis-transcribed.)      Jeremy Cruz PA-C(electronically signed)              Jeremy Cruz PA-C  10/01/21 4608       Jeremy Cruz PA-C  10/01/21 2283

## 2021-10-01 NOTE — ED TRIAGE NOTES
Patient presents to the ED with complaints of dizziness that began last night. Patient states he felt like he couldn't get out of bed this morning due to the lightheadedness. Patient ambulated back to exam room with steady gait. VSS. EKG completed. Patient denies pain. Patient does report intermittent chest pain the last week. Patient was prescribed veetid after visiting [facility] this past week.

## 2021-10-07 ENCOUNTER — TELEPHONE (OUTPATIENT)
Dept: FAMILY MEDICINE CLINIC | Age: 41
End: 2021-10-07

## 2021-10-09 ENCOUNTER — HOSPITAL ENCOUNTER (OUTPATIENT)
Age: 41
Discharge: HOME | End: 2021-10-09
Payer: COMMERCIAL

## 2021-10-09 DIAGNOSIS — E11.9 TYPE 2 DIABETES MELLITUS WITHOUT COMPLICATION, WITHOUT LONG-TERM CURRENT USE OF INSULIN (HCC): ICD-10-CM

## 2021-10-09 DIAGNOSIS — I10 ESSENTIAL HYPERTENSION: ICD-10-CM

## 2021-10-09 DIAGNOSIS — E78.5 HYPERLIPIDEMIA, UNSPECIFIED HYPERLIPIDEMIA TYPE: ICD-10-CM

## 2021-10-09 LAB
ALBUMIN SERPL-MCNC: 4.3 G/DL (ref 3.5–5.1)
ALP BLD-CCNC: 92 U/L (ref 38–126)
ALT SERPL-CCNC: 84 U/L (ref 11–66)
ANION GAP SERPL CALCULATED.3IONS-SCNC: 9 MEQ/L (ref 8–16)
AST SERPL-CCNC: 34 U/L (ref 5–40)
AVERAGE GLUCOSE: 171 MG/DL (ref 70–126)
BILIRUB SERPL-MCNC: 0.5 MG/DL (ref 0.3–1.2)
BUN BLDV-MCNC: 9 MG/DL (ref 7–22)
CALCIUM SERPL-MCNC: 9.7 MG/DL (ref 8.5–10.5)
CHLORIDE BLD-SCNC: 104 MEQ/L (ref 98–111)
CHOLESTEROL, TOTAL: 177 MG/DL (ref 100–199)
CO2: 29 MEQ/L (ref 23–33)
CREAT SERPL-MCNC: 1.1 MG/DL (ref 0.4–1.2)
CREATININE, URINE: 170.5 MG/DL
GFR SERPL CREATININE-BSD FRML MDRD: 74 ML/MIN/1.73M2
GLUCOSE BLD-MCNC: 131 MG/DL (ref 70–108)
HBA1C MFR BLD: 7.7 % (ref 4.4–6.4)
HDLC SERPL-MCNC: 36 MG/DL
LDL CHOLESTEROL CALCULATED: 110 MG/DL
MICROALBUMIN UR-MCNC: < 1.2 MG/DL
MICROALBUMIN/CREAT UR-RTO: 7 MG/G (ref 0–30)
POTASSIUM SERPL-SCNC: 4.4 MEQ/L (ref 3.5–5.2)
SODIUM BLD-SCNC: 142 MEQ/L (ref 135–145)
TOTAL PROTEIN: 6.5 G/DL (ref 6.1–8)
TRIGL SERPL-MCNC: 154 MG/DL (ref 0–199)

## 2021-10-09 PROCEDURE — 80053 COMPREHEN METABOLIC PANEL: CPT

## 2021-10-09 PROCEDURE — 80061 LIPID PANEL: CPT

## 2021-10-09 PROCEDURE — 82043 UR ALBUMIN QUANTITATIVE: CPT

## 2021-10-09 PROCEDURE — 83036 HEMOGLOBIN GLYCOSYLATED A1C: CPT

## 2021-10-09 PROCEDURE — 36415 COLL VENOUS BLD VENIPUNCTURE: CPT

## 2021-10-12 ENCOUNTER — OFFICE VISIT (OUTPATIENT)
Dept: FAMILY MEDICINE CLINIC | Age: 41
End: 2021-10-12
Payer: COMMERCIAL

## 2021-10-12 VITALS
HEART RATE: 80 BPM | TEMPERATURE: 97.8 F | SYSTOLIC BLOOD PRESSURE: 134 MMHG | DIASTOLIC BLOOD PRESSURE: 88 MMHG | RESPIRATION RATE: 14 BRPM | WEIGHT: 315 LBS | BODY MASS INDEX: 41.09 KG/M2

## 2021-10-12 DIAGNOSIS — R42 VERTIGO: ICD-10-CM

## 2021-10-12 DIAGNOSIS — E11.9 TYPE 2 DIABETES MELLITUS WITHOUT COMPLICATION, WITHOUT LONG-TERM CURRENT USE OF INSULIN (HCC): Primary | ICD-10-CM

## 2021-10-12 PROCEDURE — G8427 DOCREV CUR MEDS BY ELIG CLIN: HCPCS | Performed by: FAMILY MEDICINE

## 2021-10-12 PROCEDURE — 2022F DILAT RTA XM EVC RTNOPTHY: CPT | Performed by: FAMILY MEDICINE

## 2021-10-12 PROCEDURE — 1036F TOBACCO NON-USER: CPT | Performed by: FAMILY MEDICINE

## 2021-10-12 PROCEDURE — G8417 CALC BMI ABV UP PARAM F/U: HCPCS | Performed by: FAMILY MEDICINE

## 2021-10-12 PROCEDURE — G8484 FLU IMMUNIZE NO ADMIN: HCPCS | Performed by: FAMILY MEDICINE

## 2021-10-12 PROCEDURE — 3051F HG A1C>EQUAL 7.0%<8.0%: CPT | Performed by: FAMILY MEDICINE

## 2021-10-12 PROCEDURE — 99214 OFFICE O/P EST MOD 30 MIN: CPT | Performed by: FAMILY MEDICINE

## 2021-10-12 RX ORDER — LANCETS 30 GAUGE
EACH MISCELLANEOUS
Qty: 100 EACH | Refills: 3 | Status: SHIPPED | OUTPATIENT
Start: 2021-10-12

## 2021-10-12 RX ORDER — GLUCOSAMINE HCL/CHONDROITIN SU 500-400 MG
CAPSULE ORAL
Qty: 100 STRIP | Refills: 3 | Status: SHIPPED | OUTPATIENT
Start: 2021-10-12

## 2021-10-12 RX ORDER — MECLIZINE HCL 12.5 MG/1
12.5 TABLET ORAL 3 TIMES DAILY PRN
Qty: 21 TABLET | Refills: 0 | Status: SHIPPED | OUTPATIENT
Start: 2021-10-12 | End: 2021-10-19

## 2021-10-12 NOTE — PATIENT INSTRUCTIONS
Patient Education        Learning About Meal Planning for Diabetes  Why plan your meals? Meal planning can be a key part of managing diabetes. Planning meals and snacks with the right balance of carbohydrate, protein, and fat can help you keep your blood sugar at the target level you set with your doctor. You don't have to eat special foods. You can eat what your family eats, including sweets once in a while. But you do have to pay attention to how often you eat and how much you eat of certain foods. You may want to work with a dietitian or a certified diabetes educator. He or she can give you tips and meal ideas and can answer your questions about meal planning. This health professional can also help you reach a healthy weight if that is one of your goals. What plan is right for you? Your dietitian or diabetes educator may suggest that you start with the plate format or carbohydrate counting. The plate format  The plate format is a simple way to help you manage how you eat. You plan meals by learning how much space each food should take on a plate. Using the plate format helps you spread carbohydrate throughout the day. It can make it easier to keep your blood sugar level within your target range. It also helps you see if you're eating healthy portion sizes. To use the plate format, you put non-starchy vegetables on half your plate. Add meat or meat substitutes on one-quarter of the plate. Put a grain or starchy vegetable (such as brown rice or a potato) on the final quarter of the plate. You can add a small piece of fruit and some low-fat or fat-free milk or yogurt, depending on your carbohydrate goal for each meal.  Here are some tips for using the plate format:  · Make sure that you are not using an oversized plate. A 9-inch plate is best. Many restaurants use larger plates. · Get used to using the plate format at home. Then you can use it when you eat out.   · Write down your questions about using rapid-acting insulin to take before you eat. If you use an insulin pump, you get a constant rate of insulin during the day. So the pump must be programmed at meals to give you extra insulin to cover the rise in blood sugar after meals. When you know how much carbohydrate you will eat, you can take the right amount of insulin. Or, if you always use the same amount of insulin, you need to make sure that you eat the same amount of carbohydrate at meals. If you need more help to understand carbohydrate counting and food labels, ask your doctor, dietitian, or diabetes educator. How can you plan healthy meals? Here are some tips to get started:  · Plan your meals a week at a time. Don't forget to include snacks too. · Use cookbooks or online recipes to plan several main meals. Plan some quick meals for busy nights. You also can double some recipes that freeze well. Then you can save half for other busy nights when you don't have time to cook. · Make sure you have the ingredients you need for your recipes. If you're running low on basic items, put these items on your shopping list too. · List foods that you use to make breakfasts, lunches, and snacks. List plenty of fruits and vegetables. · Post this list on the refrigerator. Add to it as you think of more things you need. · Take the list to the store to do your weekly shopping. Follow-up care is a key part of your treatment and safety. Be sure to make and go to all appointments, and call your doctor if you are having problems. It's also a good idea to know your test results and keep a list of the medicines you take. Where can you learn more? Go to https://luanne.[employer]. org and sign in to your Jiberish account. Enter B047 in the MarketRiders box to learn more about \"Learning About Meal Planning for Diabetes. \"     If you do not have an account, please click on the \"Sign Up Now\" link.   Current as of: December 17, 2020               Content Version: 13.0   Healthwise, [employer]. Care instructions adapted under license by [facility] ([facility]). If you have questions about a medical condition or this instruction, always ask your healthcare professional. [address] any warranty or liability for your use of this information. Patient Education        Home Blood Sugar Test: About This Test  What is it? A home blood sugar test measures the amount of sugar (glucose) in your blood, using a small device called a blood sugar meter. It's a quick way to test your blood sugar anywhere, at any time. Why is this test done? Testing your blood sugar helps you know if your levels are in your target range. It helps you know when to take action and may help you avoid blood sugar emergencies. Testing also helps you learn how things like exercise, stress, and what you eat can affect your blood sugar. What happens before the test?  The supplies you will need for testing blood sugar include:  · A blood glucose meter. · Testing strips. These are made to be used with a specific model of meter. Make sure the strips haven't . · Sugar control solutions. Some meters require a specific solution. Many new meters are made to operate without a control solution. · Short needles called lancets for pricking your skin. · A pen-sized lemra for the lancet (lancet device). It positions the lancet and controls how deeply it goes into your skin. · Clean cotton balls. These are used to stop the bleeding from the testing site. What happens during the test?  Checking your blood sugar involves pricking your finger, palm, or forearm with a lancet to collect a drop of blood. The blood drop is placed on a test strip, which you insert into the blood glucose meter. The instructions for testing are slightly different for each blood glucose meter model. Follow the instructions that came with your meter.   · Wash your hands with warm, soapy water. Dry them well with a clean towel. You may also use an alcohol wipe to clean your finger or other site. But make sure your hands are dry before the test.  · Insert a clean lancet into the lancet device. · Remove a test strip from the test strip bottle. Replace the lid right away to keep moisture away from the other strips. · Follow the instructions that came with your meter to get it ready. · Use the lancet device to stick the side of your fingertip with the lancet. Do not stick the tip of your finger. Some blood sugar meters use lancet devices that take the blood sample from other sites, such as the palm of the hand or the forearm. But the finger is usually the most accurate place to test blood sugar. · Put a drop of blood on the correct spot on the test strip. · Apply pressure with a clean cotton ball to stop the bleeding. · Follow the directions that came with the meter to get the results. · Write down the results and the time that you tested your blood. Some meters will store the results for you. How long does the test take? The blood glucose meter will show the results of the test in a minute or less. What are the possible results for the test?  The American Diabetes Association (ADA) recommends that you stay within the following blood glucose level ranges. But depending on your health, you and your doctor may set a different range for you. For nonpregnant adults with diabetes  · 80 milligrams per deciliter (mg/dL) to 130 mg/dL before a meal  · Less than 180 mg/dL 1 to 2 hours after a meal  For women who have diabetes and are pregnant  · 95 mg/dL or less before breakfast  · 120 to 140 mg/dL (or lower) 1 to 2 hours after a meal  Where can you learn more? Go to https://Modustrivlad.Tap 'n Tap. org and sign in to your Dibsie account.  Enter C402 in the Llesiant box to learn more about \"Home Blood Sugar Test: About This Test.\"     If you do not have an account, please click on the \"Sign Up Now\" link. Current as of: August 31, 2020               Content Version: 13.0  © 0345-6625 Healthwise, Incorporated. Care instructions adapted under license by [facility] ([facility]). If you have questions about a medical condition or this instruction, always ask your healthcare professional. [address] any warranty or liability for your use of this information.

## 2021-10-12 NOTE — PROGRESS NOTES
Diabetic teaching provided to patient and spouse with demonstration of fingerstick blood glucose--verbalized understanding of glucometer and testing. Testing supplies, dietary information, and blood sugar log provided to patient. Rx sent to pharmacy for additional testing supplies.

## 2021-10-12 NOTE — PROGRESS NOTES
10/12/2021    Taylor Jones (:  1980) is a 46 y.o. male, Established patient, here for evaluation of the following chief complaint(s):  Discuss Labs (Wants to discuss blood work for [employer] and Other (He feels dizzy and light headed when he stands or sits up, he went to the ED 2 weeks ago for it. It happens when he is sitting or lying down as well. )      ASSESSMENT/PLAN:    1. Type 2 diabetes mellitus without complication, without long-term current use of insulin (HCC)  -     metFORMIN (GLUCOPHAGE) 500 MG tablet; Take 1 tablet by mouth daily (with breakfast), Disp-30 tablet, R-5Normal  2. Vertigo  -     meclizine (ANTIVERT) 12.5 MG tablet; Take 1 tablet by mouth 3 times daily as needed for Dizziness, Disp-21 tablet, R-0Normal    Start Metformin 500 mg daily    Diabetic teaching done by nursing. He will check blood sugars fasting in the morning with a goal of less than 120. ADA diet information given. He will cut back on starches and sugars. Trial of meclizine as needed for vertigo. Vestibular rehab if not improved. We discussed the potential long-term complications of diabetes today including heart disease, renal disease, vascular disease, and blindness. Return in about 3 months (around 2022). Hemoglobin A1c at the visit. SUBJECTIVE/OBJECTIVE:    HPI    Patient here for follow-up of recent blood work which shows worsening blood sugar. Here with his spouse today. Both admit that he is not following an ADA diet. He continues to drink regular pop and eats a lot of carbs and sugars. He has not been successful at bringing his weight down. He is a  and finds it difficult to eat healthy. There is a family history of diabetes with his father recently having a toe amputated. Patient also complains of recent dizziness with movement. He reports a spinning sensation when he turns his head. There is some associated nausea.   He denies chest pain, shortness of breath, or visual changes. Non-smoker. BMI 41.09. Review of Systems   Constitutional: Negative for fatigue and fever. HENT: Negative. Respiratory: Negative. Cardiovascular: Negative. Gastrointestinal: Positive for nausea. Genitourinary: Negative. Neurological: Positive for dizziness. All other systems reviewed and are negative. Vitals:    10/12/21 1423 10/12/21 1457   BP: (!) 140/76 134/88   Pulse: 80    Resp: 14    Temp: 97.8 °F (36.6 °C)    TempSrc: Oral    Weight: (!) 320 lb (145.2 kg)        Wt Readings from Last 3 Encounters:   10/12/21 (!) 320 lb (145.2 kg)   10/01/21 (!) 317 lb (143.8 kg)   09/26/21 (!) 314 lb (142.4 kg)       BP Readings from Last 3 Encounters:   10/12/21 134/88   10/01/21 138/70   09/26/21 (!) 137/96       Physical Exam  Constitutional:       General: He is not in acute distress. Appearance: He is well-developed. HENT:      Head: Normocephalic and atraumatic. Right Ear: Tympanic membrane normal.      Left Ear: Tympanic membrane normal.      Mouth/Throat:      Mouth: Mucous membranes are moist.      Pharynx: No posterior oropharyngeal erythema. Eyes:      Extraocular Movements:      Right eye: Nystagmus present. Left eye: Nystagmus present. Conjunctiva/sclera: Conjunctivae normal.   Cardiovascular:      Rate and Rhythm: Normal rate and regular rhythm. Heart sounds: No murmur heard. Pulmonary:      Breath sounds: Normal breath sounds. No wheezing. Musculoskeletal:      Right lower leg: No edema. Left lower leg: No edema. Lymphadenopathy:      Cervical: No cervical adenopathy. Neurological:      Mental Status: He is alert. Lab Results   Component Value Date    LABA1C 7.7 (H) 10/09/2021     No results found for: EAG      An electronic signature was used to authenticate this note.         Electronically signed by Jocelin Nick MD on 10/12/2021 at 3:03 PM

## 2021-10-13 ASSESSMENT — ENCOUNTER SYMPTOMS
NAUSEA: 1
RESPIRATORY NEGATIVE: 1

## 2021-10-14 ENCOUNTER — TELEPHONE (OUTPATIENT)
Dept: FAMILY MEDICINE CLINIC | Age: 41
End: 2021-10-14

## 2021-10-14 DIAGNOSIS — R42 VERTIGO: Primary | ICD-10-CM

## 2021-10-14 NOTE — TELEPHONE ENCOUNTER
Alysa Zaldivar MD 22 minutes ago (12:29 PM)     I am following up for Robert Pam [birth year]. He seen Dr. Александр Varghese on Tuesday and she gave him meds for vertigo. She said to let her know today how they are doing. He is still dizzy and light headed he said when he takes a pill it kind of helps but doesn't make it go away.      I'm guessing she isn't going to want him to go back to work just yet. Would it be possible to look at the physical therapy option?

## 2021-10-28 ENCOUNTER — OFFICE VISIT (OUTPATIENT)
Dept: PULMONOLOGY | Age: 41
End: 2021-10-28
Payer: COMMERCIAL

## 2021-10-28 VITALS
BODY MASS INDEX: 40.43 KG/M2 | TEMPERATURE: 97.3 F | WEIGHT: 315 LBS | HEART RATE: 79 BPM | DIASTOLIC BLOOD PRESSURE: 78 MMHG | HEIGHT: 74 IN | SYSTOLIC BLOOD PRESSURE: 132 MMHG | OXYGEN SATURATION: 99 %

## 2021-10-28 DIAGNOSIS — G47.33 OSA ON CPAP: Primary | ICD-10-CM

## 2021-10-28 DIAGNOSIS — Z99.89 OSA ON CPAP: Primary | ICD-10-CM

## 2021-10-28 PROCEDURE — G8484 FLU IMMUNIZE NO ADMIN: HCPCS | Performed by: NURSE PRACTITIONER

## 2021-10-28 PROCEDURE — G8427 DOCREV CUR MEDS BY ELIG CLIN: HCPCS | Performed by: NURSE PRACTITIONER

## 2021-10-28 PROCEDURE — G8417 CALC BMI ABV UP PARAM F/U: HCPCS | Performed by: NURSE PRACTITIONER

## 2021-10-28 PROCEDURE — 1036F TOBACCO NON-USER: CPT | Performed by: NURSE PRACTITIONER

## 2021-10-28 PROCEDURE — 99214 OFFICE O/P EST MOD 30 MIN: CPT | Performed by: NURSE PRACTITIONER

## 2021-10-28 NOTE — PROGRESS NOTES
[facility]      Uyen Ochoa         [id number]  10/29/2021   Chief Complaint   Patient presents with    Follow-up     VAMSHI 6 month sleep follow up with  [facility]         Pt of Dr. Leopoldo Dolan     PAP Download:   Original or initial AHI: 24.3     Date of initial study: 1/26/21      Compliant  33%     Noncompliant 43 %     PAP Type airsense 10 Level  10/20 cmh2o    Avg Hrs/Day 3:31  AHI: 2.4   Recorded compliance dates ,9/25/21-10/24/21  Machine/Mfg:   [x] ResMed    [] Respironics/Dreamstation   Interface:   [x] Nasal    [] Nasal pillows   [] FFM      Provider:      [x] SR-HME     []Ezio     [] Tamara    [] Yaya Davalos    [] Schwietermans               [] [employer]      [] Adaptive    [] [address]      [] Other    Neck Size: 20  Mallampati Mallampati 2  ESS:  4  SAQLI:  79    Here is a scan of the most recent download:            Presentation:   Dorie Hagan presents for sleep medicine follow up for obstructive sleep apnea  Since the last visit, Dorie Hagan has been off work about 1 month therefore getting more sleep however only average 3 hours on DL. Only meeting 43% compliance. Lost his chin strap but does not feel need for new one   Still adjusting to use but is feeling that he is getting used to it. Equipment issues: The pressure is not acceptable- does not feel enough , the mask is acceptable     Sleep issues:  Do you feel better? Yes  More rested? Yes   Better concentration? no    Progress History:   Since last visit any new medical issues? No  New ER or hospital visits? No  Any new or changes in medicines? No  Any new sleep medicines? No    Review of Systems -   Review of Systems   Constitutional: Negative for activity change, appetite change, chills, fatigue, fever and unexpected weight change. HENT: Negative. Eyes: Negative. Respiratory: Negative for cough, shortness of breath and wheezing. Cardiovascular: Negative for chest pain, palpitations and leg swelling. Gastrointestinal: Negative for abdominal pain, diarrhea, nausea and vomiting. Genitourinary: Negative. Musculoskeletal: Negative. Skin: Negative. Neurological: Negative. Hematological: Does not bruise/bleed easily. Psychiatric/Behavioral: Positive for sleep disturbance. Negative for suicidal ideas. Physical Exam:    BMI:  Body mass index is 40.7 kg/m². Wt Readings from Last 3 Encounters:   10/28/21 (!) 317 lb (143.8 kg)   10/12/21 (!) 320 lb (145.2 kg)   10/01/21 (!) 317 lb (143.8 kg)     Weight stable / unchanged  Vitals: /78 (Site: Right Lower Arm, Position: Sitting, Cuff Size: Medium Adult)   Pulse 79   Temp 97.3 °F (36.3 °C)   Ht 6' 2\" (1.88 m)   Wt (!) 317 lb (143.8 kg)   SpO2 99% Comment: on RA  BMI 40.70 kg/m²       Physical Exam  Vitals and nursing note reviewed. Constitutional:       Appearance: Normal appearance. HENT:      Head: Normocephalic and atraumatic. Eyes:      Conjunctiva/sclera: Conjunctivae normal.   Pulmonary:      Effort: No tachypnea, bradypnea or respiratory distress. Neurological:      Mental Status: He is alert and oriented to person, place, and time. Psychiatric:         Attention and Perception: Attention normal.         Mood and Affect: Mood normal.         Speech: Speech normal.         Behavior: Behavior normal.         Thought Content: Thought content normal.         Cognition and Memory: Cognition normal.         Judgment: Judgment normal.           ASSESSMENT/DIAGNOSIS     Diagnosis Orders   1. VAMSHI on CPAP  CPAP Machine MISC            Plan   Do you need any equipment today?  No  - Download reviewed and discussed with patient  -increase pressure for comfort and due to elevated AHI to 13 cm H20   - He  advised to keep good compliance with current recommended pressure to get optimal results and clinical improvement  - Recommend 7-9 hours of sleep with PAP  - He was advised to call [employer] regarding supplies if needed.   -He call my office for earlier appointment if needed for worsening of sleep symptoms.   - He was instructed on weight loss  - Saima Ortiz was educated about my impression and plan. Patient verbalizesunderstanding.   We will see Danisha Lr back in: 8 weeks with download    Information added by my medical assistant/LPN was reviewed today  Electronically signed by SRIDEVI Cardenas - CNP on 10/29/2021 at 8:22 AM

## 2021-10-29 ASSESSMENT — ENCOUNTER SYMPTOMS
SHORTNESS OF BREATH: 0
EYES NEGATIVE: 1
NAUSEA: 0
WHEEZING: 0
COUGH: 0
DIARRHEA: 0
VOMITING: 0
ABDOMINAL PAIN: 0

## 2021-11-09 NOTE — PROGRESS NOTES
Chief Complaint   Patient presents with    3 Month Follow-Up     no concerns         SUBJECTIVE     Bravo Segovia is a 47 y.o.male      Pt here for follow up of IFG/diabetes. He reports that he has cut down some on his pop and sugar intake. Weight has not gone down. He is not exercising. He continues to see cardiology for his cardiomyopathy. He states that his blood pressures are generally 130/70-80. He is unsure of why his BPs are always elevated at the office. He is a . Former smoker. Body mass index is 40.67 kg/m². Review of Systems   Constitutional: Negative for fatigue, fever and unexpected weight change. HENT: Negative. Respiratory: Negative. Cardiovascular: Negative. Endocrine: Negative for polydipsia, polyphagia and polyuria. Genitourinary: Negative. Neurological: Negative. All other systems reviewed and are negative. OBJECTIVE     BP (!) 150/90 (Site: Left Upper Arm)   Pulse 83   Temp 97 °F (36.1 °C) (Temporal)   Wt (!) 316 lb 12.8 oz (143.7 kg)   SpO2 98%   BMI 40.67 kg/m²     Physical Exam  Constitutional:       General: He is not in acute distress. Appearance: He is well-developed. HENT:      Head: Normocephalic and atraumatic. Right Ear: Tympanic membrane normal.      Left Ear: Tympanic membrane normal.      Mouth/Throat:      Mouth: Mucous membranes are moist.      Pharynx: No posterior oropharyngeal erythema. Eyes:      Conjunctiva/sclera: Conjunctivae normal.   Cardiovascular:      Rate and Rhythm: Normal rate and regular rhythm. Heart sounds: No murmur. Pulmonary:      Breath sounds: Normal breath sounds. No wheezing. Musculoskeletal:      Right lower leg: No edema. Left lower leg: No edema. Lymphadenopathy:      Cervical: No cervical adenopathy. Neurological:      Mental Status: He is alert.            Results for POC orders placed in visit on 12/21/20   POCT glycosylated hemoglobin (Hb A1C) Result Value Ref Range    Hemoglobin A1C 5.9 (H) 4.3 - 5.7 %         ASSESSMENT      1. Impaired fasting glucose    2. Hyperlipidemia, unspecified hyperlipidemia type        PLAN     Requested Prescriptions     Signed Prescriptions Disp Refills    atorvastatin (LIPITOR) 40 MG tablet 30 tablet 11     Sig: Take 1 tablet by mouth daily     HbA1C improved from previous. Work on diet and exercise to reduce weights and blood sugars    Follow up with cardiology as planned    Return in about 6 months (around 6/21/2021).               Electronically signed by Marcelle Wong MD on 12/21/2020 at 4:55 PM EOMI; PERRL; no drainage or redness

## 2021-12-30 ENCOUNTER — OFFICE VISIT (OUTPATIENT)
Dept: PULMONOLOGY | Age: 41
End: 2021-12-30
Payer: COMMERCIAL

## 2021-12-30 VITALS
OXYGEN SATURATION: 98 % | WEIGHT: 315 LBS | BODY MASS INDEX: 40.43 KG/M2 | DIASTOLIC BLOOD PRESSURE: 78 MMHG | HEART RATE: 71 BPM | TEMPERATURE: 97.5 F | SYSTOLIC BLOOD PRESSURE: 138 MMHG | HEIGHT: 74 IN

## 2021-12-30 DIAGNOSIS — G47.33 OSA ON CPAP: Primary | ICD-10-CM

## 2021-12-30 DIAGNOSIS — E66.01 OBESITY, MORBID, BMI 40.0-49.9 (HCC): ICD-10-CM

## 2021-12-30 DIAGNOSIS — I42.0 DILATED CARDIOMYOPATHY (HCC): ICD-10-CM

## 2021-12-30 DIAGNOSIS — Z78.9 CPAP VENTILATION TREATMENT NOT TOLERATED: ICD-10-CM

## 2021-12-30 DIAGNOSIS — Z99.89 OSA ON CPAP: Primary | ICD-10-CM

## 2021-12-30 PROCEDURE — 99215 OFFICE O/P EST HI 40 MIN: CPT | Performed by: NURSE PRACTITIONER

## 2021-12-30 PROCEDURE — G8427 DOCREV CUR MEDS BY ELIG CLIN: HCPCS | Performed by: NURSE PRACTITIONER

## 2021-12-30 PROCEDURE — G8417 CALC BMI ABV UP PARAM F/U: HCPCS | Performed by: NURSE PRACTITIONER

## 2021-12-30 PROCEDURE — 1036F TOBACCO NON-USER: CPT | Performed by: NURSE PRACTITIONER

## 2021-12-30 PROCEDURE — G8484 FLU IMMUNIZE NO ADMIN: HCPCS | Performed by: NURSE PRACTITIONER

## 2021-12-30 ASSESSMENT — ENCOUNTER SYMPTOMS
CHEST TIGHTNESS: 0
VOMITING: 0
DIARRHEA: 0
WHEEZING: 0
EYES NEGATIVE: 1
COUGH: 0
NAUSEA: 0
SHORTNESS OF BREATH: 0
ABDOMINAL PAIN: 0

## 2021-12-30 NOTE — PROGRESS NOTES
[facility]      Uyen Ochoa         [id number]  12/30/2021   Chief Complaint   Patient presents with    Follow-up     VAMSHI 8 week sleep follow up with [facility]           Pt of Dr. Leopoldo Dolan     PAP Download:   Original or initial AHI: 24.3     Date of initial study: 1/26/21      Compliant  23%     Noncompliant 67 %     PAP Type airsense 10Level  12 cmh2o    Avg Hrs/Day 2:40  AHI: 3.5   Recorded compliance dates , 11/23/21-12/22/21  Machine/Mfg:   [x] ResMed    [] Respironics/Dreamstation   Interface:   [x] Nasal    [] Nasal pillows   [] FFM      Provider:      [x] SR-HME     [x][employer]     [] [employer]    [] Yaya Skains    [] Schwietermans               [] [employer]      [] Adaptive    [] [address]      [] Other    Neck Size: 20  Mallampati Mallampati 2  ESS:  1  SAQLI:  79    Here is a scan of the most recent download:          Presentation:   Artdelaney Hagan presents for sleep medicine follow up for obstructive sleep apnea  Since the last visit, Artdelaney Danya struggles to keep CPAP mask on at night. Wakes up without mask on and does not recall taking it off. Wife states he wakes up after 1-2 hours of sleep gasping and pulling at mask. Some SOB during the day with strenuous activity. He does not notice wheezing but wife reports occ. 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 Denies any ESD, ESS 1   Poor sleep hygiene goes to bed at 10 pm up for work between 1-2 am       Equipment issues: The pressure is  acceptable, the mask is acceptable     Sleep issues:  Do you feel better? No  More rested? No   Better concentration? no    Progress History:   Since last visit any new medical issues? No  New ER or hospital visits? No  Any new or changes in medicines? No  Any new sleep medicines?  No    Review of Systems -   Review of Systems   Constitutional: Negative for activity change, chills, fatigue, fever and unexpected weight change. HENT: Negative. Eyes: Negative. Respiratory: Negative for cough, chest tightness, shortness of breath and wheezing. Gasping for air    Cardiovascular: Negative for chest pain, palpitations and leg swelling. Gastrointestinal: Negative for abdominal pain, diarrhea, nausea and vomiting. Genitourinary: Negative. Musculoskeletal: Negative. Skin: Negative. Neurological: Negative. Hematological: Negative. Psychiatric/Behavioral: Positive for sleep disturbance. Negative for behavioral problems and suicidal ideas. The patient is not nervous/anxious. Physical Exam:    BMI:  Body mass index is 41.19 kg/m². Wt Readings from Last 3 Encounters:   12/30/21 (!) 320 lb 12.8 oz (145.5 kg)   10/28/21 (!) 317 lb (143.8 kg)   10/12/21 (!) 320 lb (145.2 kg)     Weight stable / unchanged  Vitals: /78 (Site: Left Upper Arm, Position: Sitting)   Pulse 71   Temp 97.5 °F (36.4 °C)   Ht 6' 2\" (1.88 m)   Wt (!) 320 lb 12.8 oz (145.5 kg)   SpO2 98% Comment: on ra  BMI 41.19 kg/m²       Physical Exam  Vitals and nursing note reviewed. Constitutional:       Appearance: Normal appearance. He is obese. HENT:      Head: Normocephalic and atraumatic. Eyes:      Conjunctiva/sclera: Conjunctivae normal.   Pulmonary:      Effort: Pulmonary effort is normal. No tachypnea, bradypnea or respiratory distress. Neurological:      Mental Status: He is alert and oriented to person, place, and time. Psychiatric:         Attention and Perception: Attention normal.         Mood and Affect: Mood normal.         Speech: Speech normal.         Behavior: Behavior normal.         Thought Content: Thought content normal.         Cognition and Memory: Cognition normal.         Judgment: Judgment normal.           ASSESSMENT/DIAGNOSIS     Diagnosis Orders   1. VAMSHI on CPAP     2. CPAP ventilation treatment not tolerated     3. Obesity, morbid, BMI 40.0-49.9 ([address]     4.  Dilated

## 2022-01-03 RX ORDER — ISOSORBIDE MONONITRATE 30 MG/1
TABLET, EXTENDED RELEASE ORAL
Qty: 30 TABLET | Refills: 5 | Status: SHIPPED | OUTPATIENT
Start: 2022-01-03 | End: 2022-07-20 | Stop reason: SDUPTHER

## 2022-01-20 ENCOUNTER — OFFICE VISIT (OUTPATIENT)
Dept: FAMILY MEDICINE CLINIC | Age: 42
End: 2022-01-20
Payer: COMMERCIAL

## 2022-01-20 VITALS
DIASTOLIC BLOOD PRESSURE: 86 MMHG | RESPIRATION RATE: 16 BRPM | WEIGHT: 315 LBS | HEART RATE: 60 BPM | BODY MASS INDEX: 41.34 KG/M2 | SYSTOLIC BLOOD PRESSURE: 136 MMHG | TEMPERATURE: 97.9 F

## 2022-01-20 DIAGNOSIS — E66.01 MORBID OBESITY WITH BMI OF 40.0-44.9, ADULT (HCC): ICD-10-CM

## 2022-01-20 DIAGNOSIS — E78.5 HYPERLIPIDEMIA, UNSPECIFIED HYPERLIPIDEMIA TYPE: ICD-10-CM

## 2022-01-20 DIAGNOSIS — E11.9 TYPE 2 DIABETES MELLITUS WITHOUT COMPLICATION, WITHOUT LONG-TERM CURRENT USE OF INSULIN (HCC): Primary | ICD-10-CM

## 2022-01-20 LAB — HBA1C MFR BLD: 7.3 % (ref 4.3–5.7)

## 2022-01-20 PROCEDURE — 2022F DILAT RTA XM EVC RTNOPTHY: CPT | Performed by: FAMILY MEDICINE

## 2022-01-20 PROCEDURE — 83036 HEMOGLOBIN GLYCOSYLATED A1C: CPT | Performed by: FAMILY MEDICINE

## 2022-01-20 PROCEDURE — 3051F HG A1C>EQUAL 7.0%<8.0%: CPT | Performed by: FAMILY MEDICINE

## 2022-01-20 PROCEDURE — G8417 CALC BMI ABV UP PARAM F/U: HCPCS | Performed by: FAMILY MEDICINE

## 2022-01-20 PROCEDURE — G8427 DOCREV CUR MEDS BY ELIG CLIN: HCPCS | Performed by: FAMILY MEDICINE

## 2022-01-20 PROCEDURE — 99214 OFFICE O/P EST MOD 30 MIN: CPT | Performed by: FAMILY MEDICINE

## 2022-01-20 PROCEDURE — G8484 FLU IMMUNIZE NO ADMIN: HCPCS | Performed by: FAMILY MEDICINE

## 2022-01-20 PROCEDURE — 1036F TOBACCO NON-USER: CPT | Performed by: FAMILY MEDICINE

## 2022-01-20 RX ORDER — ATORVASTATIN CALCIUM 40 MG/1
40 TABLET, FILM COATED ORAL DAILY
Qty: 30 TABLET | Refills: 11 | Status: SHIPPED | OUTPATIENT
Start: 2022-01-20

## 2022-01-20 ASSESSMENT — ENCOUNTER SYMPTOMS
COUGH: 0
GASTROINTESTINAL NEGATIVE: 1
SHORTNESS OF BREATH: 0

## 2022-01-20 NOTE — PROGRESS NOTES
2022    Makenzie Amador (:  1980) is a 46 y.o. male, Established patient, here for evaluation of the following chief complaint(s):  3 Month Follow-Up (No concerns.)      ASSESSMENT/PLAN:    1. Type 2 diabetes mellitus without complication, without long-term current use of insulin ([facility])  -     POCT glycosylated hemoglobin (Hb A1C)  -     metFORMIN (GLUCOPHAGE) 500 MG tablet; Take 1 tablet by mouth 2 times daily (with meals), Disp-60 tablet, R-11Normal  2. Hyperlipidemia, unspecified hyperlipidemia type  -     atorvastatin (LIPITOR) 40 MG tablet; Take 1 tablet by mouth daily, Disp-30 tablet, R-11Normal  3. Morbid obesity with BMI of 40.0-44.9, adult ([facility])    Increase metformin to 500 mg p.o. twice daily for better control of diabetes    We reviewed goal hemoglobin A1c of less than 7% today    Work on [employer] and increased activity to reduce weight and blood sugars    Med refill as above    Return in about 3 months (around 2022). Hemoglobin A1c at the visit    SUBJECTIVE/OBJECTIVE:    HPI    Patient here today for follow-up of diabetes, hyperlipidemia, and morbid obesity. At his last visit, he was started on metformin 500 mg daily for his diabetes. He admits to some dietary indiscretions over the holidays and his weight is up some since his last visit. Fasting blood sugars are generally around 135. He denies any symptomatic hypoglycemia. Blood pressure stable. He is getting no formal exercise. He takes all prescribed medications as directed and denies side effects. He had a recent COVID infection, which she feels that he is recovered completely from. Non-smoker. BMI 41.34. Review of Systems   Constitutional: Negative for fatigue and fever. HENT: Negative. Respiratory: Negative for cough and shortness of breath. Cardiovascular: Negative for chest pain and leg swelling. Gastrointestinal: Negative. Genitourinary: Negative. Musculoskeletal: Negative.     Neurological: Negative for dizziness, light-headedness and headaches. All other systems reviewed and are negative. Vitals:    01/20/22 1540   BP: 136/86   Pulse: 60   Resp: 16   Temp: 97.9 °F (36.6 °C)   TempSrc: Oral   Weight: (!) 322 lb (146.1 kg)       Wt Readings from Last 3 Encounters:   01/20/22 (!) 322 lb (146.1 kg)   12/30/21 (!) 320 lb 12.8 oz (145.5 kg)   10/28/21 (!) 317 lb (143.8 kg)       BP Readings from Last 3 Encounters:   01/20/22 136/86   12/30/21 138/78   10/28/21 132/78       Physical Exam  Constitutional:       General: He is not in acute distress. Appearance: He is well-developed. HENT:      Head: Normocephalic and atraumatic. Right Ear: Tympanic membrane normal.      Left Ear: Tympanic membrane normal.      Mouth/Throat:      Mouth: Mucous membranes are moist.      Pharynx: No posterior oropharyngeal erythema. Eyes:      Conjunctiva/sclera: Conjunctivae normal.   Cardiovascular:      Rate and Rhythm: Normal rate and regular rhythm. Heart sounds: No murmur heard. Pulmonary:      Breath sounds: Normal breath sounds. No wheezing. Musculoskeletal:      Right lower leg: No edema. Left lower leg: No edema. Lymphadenopathy:      Cervical: No cervical adenopathy. Neurological:      Mental Status: He is alert.        Component      Latest Ref Rng & Units 1/20/2022 10/9/2021   Glucose      70 - 108 mg/dL  131 (H)   Creatinine      0.4 - 1.2 mg/dL  1.1   BUN      7 - 22 mg/dL  9   Sodium      135 - 145 meq/L  142   Potassium      3.5 - 5.2 meq/L  4.4   Chloride      98 - 111 meq/L  104   CO2      23 - 33 meq/L  29   CALCIUM, SERUM, [id number]      8.5 - 10.5 mg/dL  9.7   AST      5 - 40 U/L  34   Alk Phos      38 - 126 U/L  92   Total Protein      6.1 - 8.0 g/dL  6.5   Albumin      3.5 - 5.1 g/dL  4.3   Bilirubin      0.3 - 1.2 mg/dL  0.5   ALT      11 - 66 U/L  84 (H)   Cholesterol, Total      100 - 199 mg/dL  177   Triglycerides      0 - 199 mg/dL  154   HDL Cholesterol mg/dL  36   LDL Calculated      mg/dL  110   Hemoglobin A1C      4.3 - 5.7 % 7.3 (H) 7.7 (H)   AVERAGE GLUCOSE      70 - 126 mg/dL  171 (H)           An electronic signature was used to authenticate this note.         Electronically signed by Jacques Sweeney MD on 1/20/2022 at 5:01 PM

## 2022-03-24 ENCOUNTER — OFFICE VISIT (OUTPATIENT)
Dept: PULMONOLOGY | Age: 42
End: 2022-03-24
Payer: COMMERCIAL

## 2022-03-24 VITALS
DIASTOLIC BLOOD PRESSURE: 86 MMHG | HEIGHT: 74 IN | BODY MASS INDEX: 40.43 KG/M2 | HEART RATE: 70 BPM | SYSTOLIC BLOOD PRESSURE: 138 MMHG | OXYGEN SATURATION: 96 % | TEMPERATURE: 97.7 F | WEIGHT: 315 LBS

## 2022-03-24 DIAGNOSIS — Z99.89 OSA ON CPAP: Primary | ICD-10-CM

## 2022-03-24 DIAGNOSIS — E66.01 OBESITY, MORBID, BMI 40.0-49.9 (HCC): ICD-10-CM

## 2022-03-24 DIAGNOSIS — G47.33 OSA ON CPAP: Primary | ICD-10-CM

## 2022-03-24 DIAGNOSIS — I42.0 DILATED CARDIOMYOPATHY (HCC): ICD-10-CM

## 2022-03-24 DIAGNOSIS — Z72.821 POOR SLEEP HYGIENE: ICD-10-CM

## 2022-03-24 PROCEDURE — 99213 OFFICE O/P EST LOW 20 MIN: CPT | Performed by: NURSE PRACTITIONER

## 2022-03-24 PROCEDURE — G8427 DOCREV CUR MEDS BY ELIG CLIN: HCPCS | Performed by: NURSE PRACTITIONER

## 2022-03-24 PROCEDURE — G8484 FLU IMMUNIZE NO ADMIN: HCPCS | Performed by: NURSE PRACTITIONER

## 2022-03-24 PROCEDURE — 1036F TOBACCO NON-USER: CPT | Performed by: NURSE PRACTITIONER

## 2022-03-24 PROCEDURE — G8417 CALC BMI ABV UP PARAM F/U: HCPCS | Performed by: NURSE PRACTITIONER

## 2022-03-24 ASSESSMENT — ENCOUNTER SYMPTOMS
EYES NEGATIVE: 1
DIARRHEA: 0
ABDOMINAL PAIN: 0
WHEEZING: 0
SHORTNESS OF BREATH: 0
VOMITING: 0
NAUSEA: 0
COUGH: 0

## 2022-03-24 NOTE — PROGRESS NOTES
[facility]      Cindy Lopez         [id number]  3/24/2022   Chief Complaint   Patient presents with    Follow-up     VAMSHI 3 month sleep follow up [facility]         Pt of Dr. Mickey Paula     PAP Download:   Original or initial AHI: 24.3     Date of initial study: 1/26/21  weight at time of initial Study    Compliant  47%     Noncompliant 43 %     PAP Type airsense 10 Level  12 cmh2o    Avg Hrs/Day 3:43  AHI: 1.5   Recorded compliance dates , 2/20/22-3/21/22  Machine/Mfg:   [x] ResMed    [] Respironics/Dreamstation   Interface:   [] Nasal    [] Nasal pillows   [] FFM      Provider:      [x] SR-HME     [][employer]     [] [employer]    [] Ariel Gilma    [] Schw[employer]ans               [] [employer]      [] Adaptive    [] [address]      [] Other    Neck Size: 20  Mallampati Mallampati 2  ESS:  2  SAQLI:  92    Here is a scan of the most recent download:            Presentation:   [place] presents for 3 monthssleep medicine follow up for obstructive sleep apnea, poor sleep hygiene  Since the last visit, [place] is showing better compliance but his > 4 hours compliance still low   Up at 3 am to go to work, home 3-4pm , running kids around , not getting to bed until 10:30-11 pm   Feels good. Denies excessive tiredness. 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 No  Sleep Related Issues? No  New ER or hospital visits? No  Any new or changes in medicines? No  Any new sleep medicines? No    Review of Systems -   Review of Systems   Constitutional: Negative for activity change, appetite change, chills, fatigue, fever and unexpected weight change. HENT: Negative. Eyes: Negative.     Respiratory: Negative for cough, shortness of breath and wheezing. Cardiovascular: Negative for chest pain, palpitations and leg swelling. Gastrointestinal: Negative for abdominal pain, diarrhea, nausea and vomiting. Genitourinary: Negative. Musculoskeletal: Negative. Skin: Negative. Neurological: Negative. Hematological: Negative. Psychiatric/Behavioral: Negative. Negative for sleep disturbance. Physical Exam:    BMI:  Body mass index is 41.39 kg/m². Wt Readings from Last 3 Encounters:   03/24/22 (!) 322 lb 6.4 oz (146.2 kg)   01/20/22 (!) 322 lb (146.1 kg)   12/30/21 (!) 320 lb 12.8 oz (145.5 kg)     Weight stable / unchanged  Vitals: /86 (Site: Left Upper Arm, Position: Sitting)   Pulse 70   Temp 97.7 °F (36.5 °C)   Ht 6' 2\" (1.88 m)   Wt (!) 322 lb 6.4 oz (146.2 kg)   SpO2 96% Comment: on RA  BMI 41.39 kg/m²       Physical Exam  Vitals and nursing note reviewed. Constitutional:       Appearance: Normal appearance. He is obese. HENT:      Head: Normocephalic and atraumatic. Mouth/Throat:      Pharynx: Oropharynx is clear. Eyes:      Conjunctiva/sclera: Conjunctivae normal.   Pulmonary:      Effort: Pulmonary effort is normal. No tachypnea, bradypnea or respiratory distress. Skin:     Findings: No erythema or rash. Neurological:      Mental Status: He is alert and oriented to person, place, and time. Psychiatric:         Attention and Perception: Attention normal.         Mood and Affect: Mood normal.         Speech: Speech normal.         Behavior: Behavior normal.         Thought Content: Thought content normal.         Cognition and Memory: Cognition normal.         Judgment: Judgment normal.           ASSESSMENT/DIAGNOSIS     Diagnosis Orders   1. VAMSHI on CPAP  DME Order for CPAP as OP   2. Obesity, morbid, BMI 40.0-49.9 ([address]     3. Dilated cardiomyopathy ([address]     4. Poor sleep hygiene              Plan   Do you need any equipment today?  Yes -printed rx for supplies, faxed to patient's DME - Download reviewed and discussed with patient  - He  was advised to continue current positive airway pressure therapy with above described pressure. - He  advised to work on better compliance with current recommended pressure to get optimal results and clinical improvement with > 4 hours of use  -needs to go to bed earlier to get more sleep   - Recommend 7-9 hours of sleep with PAP  - He was advised to call [employer] regarding supplies if needed.   -He call my office for earlier appointment if needed for worsening of sleep symptoms.   - He was instructed on weight loss  - Rene Mora was educated about my impression and plan. Patient verbalizesunderstanding.     We will see Rayna Viridiana back in: 1 year with download    Information added by my medical assistant/LPN was reviewed today    billing based on time: total time on encounter 20 min     SRIDEVI Adamson-JAKY   3/24/2022

## 2022-04-08 DIAGNOSIS — I10 ESSENTIAL HYPERTENSION: ICD-10-CM

## 2022-04-08 RX ORDER — METOPROLOL SUCCINATE 100 MG/1
100 TABLET, EXTENDED RELEASE ORAL DAILY
Qty: 30 TABLET | Refills: 11 | Status: SHIPPED | OUTPATIENT
Start: 2022-04-08

## 2022-04-08 NOTE — TELEPHONE ENCOUNTER
This medication refill is regarding a MyChart request.  Refill requested by patient. Requested Prescriptions     Pending Prescriptions Disp Refills    metoprolol succinate (TOPROL XL) 100 MG extended release tablet 30 tablet 11     Sig: Take 1 tablet by mouth daily     Date of last visit: 1/20/2022   Date of next visit: 4/20/2022  Date of last refill: 3/4/21 #30/11  Pharmacy Name: [address]    Rx verified, ordered and set to EP.

## 2022-04-08 NOTE — TELEPHONE ENCOUNTER
Rx EP'd to pharmacy. Please notify patient.       Requested Prescriptions     Signed Prescriptions Disp Refills    metoprolol succinate (TOPROL XL) 100 MG extended release tablet 30 tablet 11     Sig: Take 1 tablet by mouth daily     Authorizing Provider: Lissette Valle           Electronically signed by Mary Carpenter MD on 4/8/2022 at 9:54 AM

## 2022-04-20 ENCOUNTER — OFFICE VISIT (OUTPATIENT)
Dept: FAMILY MEDICINE CLINIC | Age: 42
End: 2022-04-20
Payer: COMMERCIAL

## 2022-04-20 ENCOUNTER — TELEPHONE (OUTPATIENT)
Dept: FAMILY MEDICINE CLINIC | Age: 42
End: 2022-04-20

## 2022-04-20 VITALS
RESPIRATION RATE: 16 BRPM | WEIGHT: 315 LBS | BODY MASS INDEX: 41.65 KG/M2 | SYSTOLIC BLOOD PRESSURE: 136 MMHG | DIASTOLIC BLOOD PRESSURE: 104 MMHG | HEART RATE: 88 BPM | TEMPERATURE: 97.6 F

## 2022-04-20 DIAGNOSIS — E11.9 TYPE 2 DIABETES MELLITUS WITHOUT COMPLICATION, WITHOUT LONG-TERM CURRENT USE OF INSULIN (HCC): Primary | ICD-10-CM

## 2022-04-20 DIAGNOSIS — I10 ESSENTIAL HYPERTENSION: ICD-10-CM

## 2022-04-20 DIAGNOSIS — E66.01 MORBID OBESITY WITH BMI OF 40.0-44.9, ADULT (HCC): ICD-10-CM

## 2022-04-20 LAB — HBA1C MFR BLD: 7.7 % (ref 4.3–5.7)

## 2022-04-20 PROCEDURE — 1036F TOBACCO NON-USER: CPT | Performed by: FAMILY MEDICINE

## 2022-04-20 PROCEDURE — 2022F DILAT RTA XM EVC RTNOPTHY: CPT | Performed by: FAMILY MEDICINE

## 2022-04-20 PROCEDURE — G8417 CALC BMI ABV UP PARAM F/U: HCPCS | Performed by: FAMILY MEDICINE

## 2022-04-20 PROCEDURE — 99214 OFFICE O/P EST MOD 30 MIN: CPT | Performed by: FAMILY MEDICINE

## 2022-04-20 PROCEDURE — G8427 DOCREV CUR MEDS BY ELIG CLIN: HCPCS | Performed by: FAMILY MEDICINE

## 2022-04-20 PROCEDURE — 83036 HEMOGLOBIN GLYCOSYLATED A1C: CPT | Performed by: FAMILY MEDICINE

## 2022-04-20 PROCEDURE — 3051F HG A1C>EQUAL 7.0%<8.0%: CPT | Performed by: FAMILY MEDICINE

## 2022-04-20 ASSESSMENT — ENCOUNTER SYMPTOMS
VOMITING: 1
RESPIRATORY NEGATIVE: 1
DIARRHEA: 1
NAUSEA: 1

## 2022-04-20 ASSESSMENT — PATIENT HEALTH QUESTIONNAIRE - PHQ9
2. FEELING DOWN, DEPRESSED OR HOPELESS: 0
SUM OF ALL RESPONSES TO PHQ9 QUESTIONS 1 & 2: 0
1. LITTLE INTEREST OR PLEASURE IN DOING THINGS: 0
SUM OF ALL RESPONSES TO PHQ QUESTIONS 1-9: 0

## 2022-04-20 NOTE — PROGRESS NOTES
2022      Parviz Mejia (:  1980) is a 38 y.o. male,Established patient, here for evaluation of the following chief complaint(s):  3 Month Follow-Up, Discuss Labs, and Nausea & Vomiting (upset stomach 1-2 hour after eating x 1 month or so)        ASSESSMENT/PLAN     1. Type 2 diabetes mellitus without complication, without long-term current use of insulin (HCC)  -     POCT glycosylated hemoglobin (Hb A1C)  -     SITagliptin (JANUVIA) 100 MG tablet; Take 1 tablet by mouth daily, Disp-90 tablet, R-3Normal  2. Essential hypertension  3. Morbid obesity with BMI of 40.0-44.9, adult ([address]    Stop metformin due to GI side effects    Ambulatory BPs 2-3 times a week for 3 weeks and call or fax to the office. Januvia 100mg daily for diabetes    ADA diet and increased activity recommended to reduce weight and blood sugars    Call if GI symptoms not improved    Follow up in 3 months with HbA1C at the visit. SUBJECTIVE     Parviz Mejia is a 38 y.o.male      Here for follow up of chronic health problems including:    Patient Active Problem List   Diagnosis    Morbid obesity with BMI of 40.0-44.9, adult ([address]    Dilated cardiomyopathy ([address]    Type 2 diabetes mellitus without complication, without long-term current use of insulin ([address]    VAMSHI on CPAP    Essential hypertension    Hyperlipidemia     Patient reports diarrhea since increasing the dose of metformin. He is also having vomiting after eating. His blood sugars remain higher. He is not exercising or watching his diet closely. BPs have been ok at home. He is taking his medication consistently. He follows up with his specialists regularly. Unfortunately, his weight is up. Former smoker. Body mass index is 41.65 kg/m². Review of Systems   Constitutional: Positive for unexpected weight change (gain). Negative for fatigue. HENT: Negative. Respiratory: Negative.     Cardiovascular: Negative for chest pain and leg swelling. Gastrointestinal: Positive for diarrhea, nausea and vomiting. Genitourinary: Negative. Neurological: Negative. All other systems reviewed and are negative. OBJECTIVE     BP (!) 136/104   Pulse 88   Temp 97.6 °F (36.4 °C) (Oral)   Resp 16   Wt (!) 324 lb 6.4 oz (147.1 kg)   BMI 41.65 kg/m²     Wt Readings from Last 3 Encounters:   04/20/22 (!) 324 lb 6.4 oz (147.1 kg)   03/24/22 (!) 322 lb 6.4 oz (146.2 kg)   01/20/22 (!) 322 lb (146.1 kg)       Physical Exam  Constitutional:       General: He is not in acute distress. Appearance: He is well-developed. HENT:      Head: Normocephalic and atraumatic. Right Ear: Tympanic membrane normal.      Left Ear: Tympanic membrane normal.      Mouth/Throat:      Mouth: Mucous membranes are moist.      Pharynx: No posterior oropharyngeal erythema. Eyes:      Conjunctiva/sclera: Conjunctivae normal.   Cardiovascular:      Rate and Rhythm: Normal rate and regular rhythm. Heart sounds: No murmur heard. Pulmonary:      Breath sounds: Normal breath sounds. No wheezing. Musculoskeletal:      Right lower leg: No edema. Left lower leg: No edema. Lymphadenopathy:      Cervical: No cervical adenopathy. Neurological:      Mental Status: He is alert. Lab Results   Component Value Date    LABA1C 7.7 (H) 04/20/2022     No results found for: EAG            There is no immunization history on file for this patient.       Health Maintenance   Topic Date Due    COVID-19 Vaccine (1) Never done    Diabetic foot exam  Never done    HIV screen  Never done    Diabetic retinal exam  Never done    Hepatitis C screen  Never done    Hepatitis B vaccine (1 of 3 - Risk 3-dose series) Never done    Depression Screen  03/04/2022    DTaP/Tdap/Td vaccine (1 - Tdap) 01/20/2023 (Originally 3/23/2001)    Flu vaccine (Season Ended) 01/20/2023 (Originally 9/1/2022)    Pneumococcal 0-64 years Vaccine (1 - PCV) 01/20/2023 (Originally 3/23/1988)    Diabetic microalbuminuria test  10/09/2022    Lipids  10/09/2022    Potassium  10/09/2022    Creatinine  10/09/2022    A1C test (Diabetic or Prediabetic)  04/20/2023    Hepatitis A vaccine  Aged Out    Hib vaccine  Aged Out    Meningococcal (ACWY) vaccine  Aged Out    Varicella vaccine  Discontinued               An electronic signature was used to authenticate this note.               Electronically signed by Tana Friedman MD on 4/20/2022 at 3:58 PM

## 2022-05-02 ENCOUNTER — OFFICE VISIT (OUTPATIENT)
Dept: CARDIOLOGY CLINIC | Age: 42
End: 2022-05-02
Payer: COMMERCIAL

## 2022-05-02 VITALS
WEIGHT: 315 LBS | HEART RATE: 82 BPM | SYSTOLIC BLOOD PRESSURE: 152 MMHG | DIASTOLIC BLOOD PRESSURE: 98 MMHG | BODY MASS INDEX: 40.43 KG/M2 | HEIGHT: 74 IN

## 2022-05-02 DIAGNOSIS — I10 PRIMARY HYPERTENSION: Primary | ICD-10-CM

## 2022-05-02 DIAGNOSIS — E78.01 FAMILIAL HYPERCHOLESTEROLEMIA: ICD-10-CM

## 2022-05-02 DIAGNOSIS — I25.10 CORONARY ARTERY DISEASE INVOLVING NATIVE CORONARY ARTERY OF NATIVE HEART WITHOUT ANGINA PECTORIS: ICD-10-CM

## 2022-05-02 PROCEDURE — 1036F TOBACCO NON-USER: CPT | Performed by: NUCLEAR MEDICINE

## 2022-05-02 PROCEDURE — 99214 OFFICE O/P EST MOD 30 MIN: CPT | Performed by: NUCLEAR MEDICINE

## 2022-05-02 PROCEDURE — G8417 CALC BMI ABV UP PARAM F/U: HCPCS | Performed by: NUCLEAR MEDICINE

## 2022-05-02 PROCEDURE — G8427 DOCREV CUR MEDS BY ELIG CLIN: HCPCS | Performed by: NUCLEAR MEDICINE

## 2022-05-02 NOTE — PROGRESS NOTES
[id number] [address] [place] [address]  [address]  [facility] [address]  Dept: [phone]  Dept Fax: [phone]  Loc: [phone]    Visit Date: 2022    Neisha Hagan is a 38 y.o. male who presents todayfor:  Chief Complaint   Patient presents with    Follow-up    Hypertension    Coronary Artery Disease    Hyperlipidemia     Cath before  Mild CAd  Some more dyspnea  Some chest pain at times  With activity   Known HTN  On medical Rx  Seems under control at home  Very high today !!  No dizziness  No syncope  On statins for hyperlipidemia  Does have signs of sleep apnea  On CPAP     HPI:  HPI  Past Medical History:   Diagnosis Date    Hyperlipidemia     Hypertension       Past Surgical History:   Procedure Laterality Date    ANKLE FRACTURE SURGERY Left Age 25     Family History   Problem Relation Age of Onset    High Blood Pressure Father     Diabetes Father     Other Father         CHF    Stroke Father     Heart Disease Paternal Grandfather     Anxiety Disorder Mother     No Known Problems Brother      Social History     Tobacco Use    Smoking status: Former Smoker     Quit date:      Years since quittin.3    Smokeless tobacco: Never Used   Substance Use Topics    Alcohol use: Yes     Alcohol/week: 0.0 standard drinks     Comment: socially      Current Outpatient Medications   Medication Sig Dispense Refill    metoprolol succinate (TOPROL XL) 100 MG extended release tablet Take 1 tablet by mouth daily 30 tablet 11    atorvastatin (LIPITOR) 40 MG tablet Take 1 tablet by mouth daily 30 tablet 11    isosorbide mononitrate (IMDUR) 30 MG extended release tablet Take 1 tablet by mouth once daily 30 tablet 5    CPAP Machine MISC by Does not apply route Please change CPAP pressure to 12 cm H20. 1 each 0    blood glucose monitor kit and supplies Accuchek brand (or brand covered by insurance)  Use daily as directed.  Dx: E11.9 1 kit 0    blood glucose monitor strips Accuchek brand (or brand covered by insurance)  Test daily and prn. Dx: E11.9 100 strip 3    Lancets MISC Accuchek lancets (or brand covered by insurance) Test daily and prn. Dx: E11.9 100 each 3    SITagliptin (JANUVIA) 100 MG tablet Take 1 tablet by mouth daily (Patient not taking: Reported on 5/2/2022) 90 tablet 3     No current facility-administered medications for this visit. No Known Allergies  Health Maintenance   Topic Date Due    COVID-19 Vaccine (1) Never done    Diabetic foot exam  Never done    HIV screen  Never done    Diabetic retinal exam  Never done    Hepatitis C screen  Never done    Hepatitis B vaccine (1 of 3 - Risk 3-dose series) Never done    DTaP/Tdap/Td vaccine (1 - Tdap) 01/20/2023 (Originally 3/23/2001)    Flu vaccine (Season Ended) 01/20/2023 (Originally 9/1/2022)    Pneumococcal 0-64 years Vaccine (1 - PCV) 01/20/2023 (Originally 3/23/1988)    Diabetic microalbuminuria test  10/09/2022    Lipids  10/09/2022    Potassium  10/09/2022    Creatinine  10/09/2022    A1C test (Diabetic or Prediabetic)  04/20/2023    Depression Screen  04/20/2023    Hepatitis A vaccine  Aged Out    Hib vaccine  Aged Out    Meningococcal (ACWY) vaccine  Aged Out    Varicella vaccine  Discontinued       Subjective:  Review of Systems  General:   No fever, no chills, some fatigue or weight loss  Pulmonary:    some dyspnea, no wheezing  Cardiac:    Denies recent chest pain,   GI:     No nausea or vomiting, no abdominal pain  Neuro:    No dizziness or light headedness,   Musculoskeletal:  No recent active issues  Extremities:   No edema, no obvious claudication       Objective:  Physical Exam  BP (!) 172/100   Pulse 82   Ht 6' 2\" (1.88 m)   Wt (!) 322 lb (146.1 kg)   BMI 41.34 kg/m²   General:   Well developed, well nourished  Lungs:   Clear to auscultation  Heart:    Normal S1 S2, Slight murmur.  no rubs, no gallops  Abdomen:   Soft, non tender, no organomegalies, positive bowel sounds  Extremities:   No edema, no cyanosis, good peripheral pulses  Neurological:   Awake, alert, oriented. No obvious focal deficits  Musculoskelatal:  No obvious deformities    Assessment:      Diagnosis Orders   1. Primary hypertension     2. Familial hypercholesterolemia     3. Coronary artery disease involving native coronary artery of native heart without angina pectoris     as above  Higher risk patient   Weight issues  Obesity: extensive discussion was made with the patient regarding diet and weight control including specific food items to avoid and cut down      Plan:  No follow-ups on file. As above  Monitor the BP   Consider ACEI for the BP  Continue risk factor modification and medical management    Thank you for allowing me to participate in the care of your patient. Please don't hesitate to contact me regarding any further issues related to the patient care    Orders Placed:  No orders of the defined types were placed in this encounter. Medications Prescribed:  No orders of the defined types were placed in this encounter. Discussed use, benefit, and side effects of prescribed medications. All patient questions answered. Pt voicedunderstanding. Instructed to continue current medications, diet and exercise. Continue risk factor modification and medical management. Patient agreed with treatment plan. Follow up as directed.     Electronically signedby Jeanette Roe MD on 5/2/2022 at 8:56 AM

## 2022-06-20 NOTE — TELEPHONE ENCOUNTER
Stop Metformin and start glimepiride 1mg po bid #60/1. Call blood sugars in 1 month or bring to follow up appt on 7/20/22.  Please update med list. CG

## 2022-06-20 NOTE — TELEPHONE ENCOUNTER
Wife sent a message in her own [employer] account regarding patient. Message is as follows:    Mook Traylor was in a couple months ago and you prescribed Januvia for him and told him to stop the metformin because he was throwing up. He never got the Januvia because it was around $500 for a months supply with our insurance. He has still been taking the metformin and has still been getting sick with constant heartburn. Is there anything else you could switch him too that wouldnt be as expensive. I do apologize I thought he contacted you already about this but that is not the case. Please advise.

## 2022-06-27 RX ORDER — GLIMEPIRIDE 1 MG/1
1 TABLET ORAL 2 TIMES DAILY
Qty: 60 TABLET | Refills: 1 | Status: SHIPPED | OUTPATIENT
Start: 2022-06-27 | End: 2022-07-20 | Stop reason: SDUPTHER

## 2022-07-20 ENCOUNTER — OFFICE VISIT (OUTPATIENT)
Dept: FAMILY MEDICINE CLINIC | Age: 42
End: 2022-07-20
Payer: COMMERCIAL

## 2022-07-20 VITALS
HEIGHT: 74 IN | HEART RATE: 92 BPM | DIASTOLIC BLOOD PRESSURE: 88 MMHG | WEIGHT: 310.2 LBS | BODY MASS INDEX: 39.81 KG/M2 | SYSTOLIC BLOOD PRESSURE: 146 MMHG | RESPIRATION RATE: 16 BRPM

## 2022-07-20 DIAGNOSIS — E11.9 TYPE 2 DIABETES MELLITUS WITHOUT COMPLICATION, WITHOUT LONG-TERM CURRENT USE OF INSULIN (HCC): Primary | ICD-10-CM

## 2022-07-20 DIAGNOSIS — I10 ESSENTIAL HYPERTENSION: ICD-10-CM

## 2022-07-20 LAB — HBA1C MFR BLD: 9.6 % (ref 4.3–5.7)

## 2022-07-20 PROCEDURE — 3046F HEMOGLOBIN A1C LEVEL >9.0%: CPT | Performed by: FAMILY MEDICINE

## 2022-07-20 PROCEDURE — 83036 HEMOGLOBIN GLYCOSYLATED A1C: CPT | Performed by: FAMILY MEDICINE

## 2022-07-20 PROCEDURE — 99214 OFFICE O/P EST MOD 30 MIN: CPT | Performed by: FAMILY MEDICINE

## 2022-07-20 RX ORDER — ISOSORBIDE MONONITRATE 30 MG/1
TABLET, EXTENDED RELEASE ORAL
Qty: 30 TABLET | Refills: 5 | Status: SHIPPED | OUTPATIENT
Start: 2022-07-20

## 2022-07-20 RX ORDER — GLIMEPIRIDE 2 MG/1
2 TABLET ORAL 2 TIMES DAILY
Qty: 60 TABLET | Refills: 2 | Status: SHIPPED | OUTPATIENT
Start: 2022-07-20 | End: 2022-10-20 | Stop reason: SDUPTHER

## 2022-07-20 SDOH — ECONOMIC STABILITY: FOOD INSECURITY: WITHIN THE PAST 12 MONTHS, THE FOOD YOU BOUGHT JUST DIDN'T LAST AND YOU DIDN'T HAVE MONEY TO GET MORE.: NEVER TRUE

## 2022-07-20 SDOH — ECONOMIC STABILITY: FOOD INSECURITY: WITHIN THE PAST 12 MONTHS, YOU WORRIED THAT YOUR FOOD WOULD RUN OUT BEFORE YOU GOT MONEY TO BUY MORE.: NEVER TRUE

## 2022-07-20 ASSESSMENT — ENCOUNTER SYMPTOMS
SHORTNESS OF BREATH: 0
COUGH: 0
GASTROINTESTINAL NEGATIVE: 1

## 2022-07-20 ASSESSMENT — SOCIAL DETERMINANTS OF HEALTH (SDOH): HOW HARD IS IT FOR YOU TO PAY FOR THE VERY BASICS LIKE FOOD, HOUSING, MEDICAL CARE, AND HEATING?: NOT HARD AT ALL

## 2022-07-20 NOTE — PROGRESS NOTES
2022    Dionne Jean-Baptiste (:  1980) is a 38 y.o. male, Established patient, here for evaluation of the following chief complaint(s):  3 Month Follow-Up (Diabetes)      ASSESSMENT/PLAN:    1. Type 2 diabetes mellitus without complication, without long-term current use of insulin (HCC)  -     POCT glycosylated hemoglobin (Hb A1C)  -     glimepiride (AMARYL) 2 MG tablet; Take 1 tablet by mouth in the morning and at bedtime, Disp-60 tablet, R-2Normal  -     Hemoglobin A1C; Future  -     Comprehensive Metabolic Panel; Future  -     TSH with Reflex; Future  -     Lipid Panel; Future  -     Microalbumin / Creatinine Urine Ratio; Future  2. Essential hypertension  -     Comprehensive Metabolic Panel; Future  -     Lipid Panel; Future  -     isosorbide mononitrate (IMDUR) 30 MG extended release tablet; Take 1 tablet by mouth once daily, Disp-30 tablet, R-5Normal    Increase Amaryl to 2 mg p.o. twice daily. He is encouraged to take medication regularly and consistently. He is encouraged to get back to a diabetic diet and increase his activity level. He states that he is going to start riding a bike. Medication refill given for Imdur today. His blood pressure should improve once he restarts the medication. Labs as above before next visit    We reviewed goal hemoglobin A1c of less than 7% today    Return in about 3 months (around 10/20/2022). SUBJECTIVE/OBJECTIVE:    HPI    Patient today for follow-up of diabetes, hypertension, and obesity. His insurance would not cover Sunnyuvia so he never started the medication. He was subsequently started on glimepiride 1 mg p.o. twice daily. He admits that he often only takes 1 dose per day. He does not check his blood sugars often. He has been out of his isosorbide over the last few days and his blood pressure has been elevated. He is not watching his diet closely or getting much exercise. His weight however is down a little compared to his last visit. 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 Review of Systems   Constitutional:  Negative for fatigue, fever and unexpected weight change. HENT: Negative. Respiratory:  Negative for cough and shortness of breath. Cardiovascular:  Negative for chest pain and leg swelling. Gastrointestinal: Negative. Genitourinary: Negative. Musculoskeletal: Negative. Neurological:  Negative for dizziness and headaches. All other systems reviewed and are negative. Vitals:    07/20/22 1534   BP: (!) 146/88   Pulse: 92   Resp: 16   Weight: (!) 310 lb 3.2 oz (140.7 kg)   Height: 6' 2\" (1.88 m)       Wt Readings from Last 3 Encounters:   07/20/22 (!) 310 lb 3.2 oz (140.7 kg)   05/02/22 (!) 322 lb (146.1 kg)   04/20/22 (!) 324 lb 6.4 oz (147.1 kg)       BP Readings from Last 3 Encounters:   07/20/22 (!) 146/88   05/02/22 (!) 152/98   04/20/22 (!) 136/104       Physical Exam  Constitutional:       General: He is not in acute distress. Appearance: He is well-developed. HENT:      Head: Normocephalic and atraumatic. Right Ear: Tympanic membrane normal.      Left Ear: Tympanic membrane normal.      Mouth/Throat:      Mouth: Mucous membranes are moist.      Pharynx: No posterior oropharyngeal erythema. Eyes:      Conjunctiva/sclera: Conjunctivae normal.   Cardiovascular:      Rate and Rhythm: Normal rate and regular rhythm. Heart sounds: No murmur heard. Pulmonary:      Breath sounds: Normal breath sounds. No wheezing. Musculoskeletal:      Right lower leg: No edema. Left lower leg: No edema. Lymphadenopathy:      Cervical: No cervical adenopathy. Neurological:      Mental Status: He is alert.        Results for POC orders placed in visit on 07/20/22   POCT glycosylated hemoglobin (Hb A1C)   Result Value Ref Range    Hemoglobin A1C 9.6 (H) 4.3 - 5.7 %             An electronic signature was used to authenticate this note.         Electronically signed by Rehana Zuniga MD on 7/20/2022 at 4:49 PM

## 2022-08-02 ENCOUNTER — TELEPHONE (OUTPATIENT)
Dept: FAMILY MEDICINE CLINIC | Age: 42
End: 2022-08-02

## 2022-08-02 DIAGNOSIS — R11.2 NAUSEA AND VOMITING, INTRACTABILITY OF VOMITING NOT SPECIFIED, UNSPECIFIED VOMITING TYPE: Primary | ICD-10-CM

## 2022-08-02 NOTE — TELEPHONE ENCOUNTER
Per wife's mychart message     \"  Selin Avery is still either throwing up or just feels bad after he eats. Its mainly after supper when he is in the shower that he throws up. But he says he doesnt feel good after eats anything. Could this still be from the medicine? \"

## 2022-10-20 ENCOUNTER — OFFICE VISIT (OUTPATIENT)
Dept: FAMILY MEDICINE CLINIC | Age: 42
End: 2022-10-20
Payer: COMMERCIAL

## 2022-10-20 VITALS
BODY MASS INDEX: 41.5 KG/M2 | RESPIRATION RATE: 16 BRPM | TEMPERATURE: 97.5 F | SYSTOLIC BLOOD PRESSURE: 132 MMHG | HEART RATE: 64 BPM | WEIGHT: 315 LBS | DIASTOLIC BLOOD PRESSURE: 82 MMHG

## 2022-10-20 DIAGNOSIS — E11.9 TYPE 2 DIABETES MELLITUS WITHOUT COMPLICATION, WITHOUT LONG-TERM CURRENT USE OF INSULIN (HCC): ICD-10-CM

## 2022-10-20 LAB — HBA1C MFR BLD: 6.7 % (ref 4.3–5.7)

## 2022-10-20 PROCEDURE — G8427 DOCREV CUR MEDS BY ELIG CLIN: HCPCS | Performed by: FAMILY MEDICINE

## 2022-10-20 PROCEDURE — 3044F HG A1C LEVEL LT 7.0%: CPT | Performed by: FAMILY MEDICINE

## 2022-10-20 PROCEDURE — G8417 CALC BMI ABV UP PARAM F/U: HCPCS | Performed by: FAMILY MEDICINE

## 2022-10-20 PROCEDURE — G8484 FLU IMMUNIZE NO ADMIN: HCPCS | Performed by: FAMILY MEDICINE

## 2022-10-20 PROCEDURE — 99213 OFFICE O/P EST LOW 20 MIN: CPT | Performed by: FAMILY MEDICINE

## 2022-10-20 PROCEDURE — 2022F DILAT RTA XM EVC RTNOPTHY: CPT | Performed by: FAMILY MEDICINE

## 2022-10-20 PROCEDURE — 83036 HEMOGLOBIN GLYCOSYLATED A1C: CPT | Performed by: FAMILY MEDICINE

## 2022-10-20 PROCEDURE — 1036F TOBACCO NON-USER: CPT | Performed by: FAMILY MEDICINE

## 2022-10-20 RX ORDER — OMEPRAZOLE 20 MG/1
20 CAPSULE, DELAYED RELEASE ORAL DAILY
COMMUNITY
Start: 2022-09-05

## 2022-10-20 RX ORDER — GLIMEPIRIDE 2 MG/1
2 TABLET ORAL 2 TIMES DAILY
Qty: 60 TABLET | Refills: 11 | Status: SHIPPED | OUTPATIENT
Start: 2022-10-20

## 2022-10-20 ASSESSMENT — ENCOUNTER SYMPTOMS
BLOOD IN STOOL: 0
EYES NEGATIVE: 1
SHORTNESS OF BREATH: 0
ABDOMINAL PAIN: 0
CHEST TIGHTNESS: 0

## 2022-10-20 NOTE — LETTER
[address] [address]  Phone: [phone]  Fax: [phone]    Ryder Keenan MD        October 20, 2022     Patient: Paty Goldstein   YOB: 1980   Date of Visit: 10/20/2022       To Whom It May Concern: It is my medical opinion that Ronnie Jewell is being treated in my office for diabetes. His last HbA1C test result was 6.7%, indicating optimal control of his blood sugars. His diabetes is now well controlled. If you have any questions or concerns, please don't hesitate to call.     Sincerely,        Ryder Keenan MD

## 2022-10-20 NOTE — PROGRESS NOTES
10/20/2022      Fabrice Winston (:  1980) is a 38 y.o. male,Established patient, here for evaluation of the following chief complaint(s):  3 Month Follow-Up (Check up for DM. Did not complete labs (forgot). Will do hga1c in office today. )        ASSESSMENT/PLAN     1. Type 2 diabetes mellitus without complication, without long-term current use of insulin (HCC)  -     POCT glycosylated hemoglobin (Hb A1C)  -     glimepiride (AMARYL) 2 MG tablet; Take 1 tablet by mouth 2 times daily, Disp-60 tablet, R-11Normal    His blood sugars are significantly improved compared to previous and his hemoglobin A1c is now under goal.    Continue current medications    Continue to work on an [employer] and increased activity to reduce weight and blood sugars    Dilated eye exam recommended    He declines vaccines today    Orders reprinted for his yearly lab tests. He will get these done in the next week. Return in about 6 months (around 2023) for Annual Physical.    SUBJECTIVE     Fabrice Winston is a 38 y.o.male      Here for follow up of chronic health problems including:    Patient Active Problem List   Diagnosis    Morbid obesity with BMI of 40.0-44.9, adult ([address]    Dilated cardiomyopathy [address]    Type 2 diabetes mellitus without complication, without long-term current use of insulin ([facility]    VAMSHI on CPAP    Essential hypertension    Hyperlipidemia     Kaushik Bailey reports that he has changed his diet since his last visit here. His blood sugars are starting to come down. He denies any symptoms of hypoglycemia. He did not get the requested blood testing done before today's visit. He is taking his prescribed medications as directed and denies side effects. No recent illnesses or hospitalizations. Blood pressures are stable. He follows up with his cardiologist yearly. Non-smoker. BMI 41.50. Review of Systems   Constitutional:  Negative for chills, fatigue, fever and unexpected weight change.    HENT: Negative. Eyes: Negative. Respiratory:  Negative for chest tightness and shortness of breath. Cardiovascular:  Negative for chest pain, palpitations and leg swelling. Gastrointestinal:  Negative for abdominal pain and blood in stool. Genitourinary:  Negative for dysuria. Musculoskeletal:  Negative for joint swelling. Skin:  Negative for rash. Neurological:  Negative for dizziness. Psychiatric/Behavioral: Negative. All other systems reviewed and are negative. OBJECTIVE     /82   Pulse 64   Temp 97.5 °F (36.4 °C) (Oral)   Resp 16   Wt (!) 323 lb 3.2 oz (146.6 kg)   BMI 41.50 kg/m²     Wt Readings from Last 3 Encounters:   10/20/22 (!) 323 lb 3.2 oz (146.6 kg)   07/20/22 (!) 310 lb 3.2 oz (140.7 kg)   05/02/22 (!) 322 lb (146.1 kg)       Physical Exam  Constitutional:       General: He is not in acute distress. Appearance: He is well-developed. HENT:      Head: Normocephalic and atraumatic. Right Ear: Tympanic membrane normal.      Left Ear: Tympanic membrane normal.      Mouth/Throat:      Mouth: Mucous membranes are moist.      Pharynx: No posterior oropharyngeal erythema. Eyes:      Conjunctiva/sclera: Conjunctivae normal.   Cardiovascular:      Rate and Rhythm: Normal rate and regular rhythm. Heart sounds: No murmur heard. Pulmonary:      Breath sounds: Normal breath sounds. No wheezing. Musculoskeletal:      Right lower leg: No edema. Left lower leg: No edema. Lymphadenopathy:      Cervical: No cervical adenopathy. Neurological:      Mental Status: He is alert. Hemoglobin A1C   Date Value Ref Range Status   10/20/2022 6.7 (H) 4.3 - 5.7 % Final     Comment:     Performed at [facility] under CLIA # [id number]         There is no immunization history on file for this patient.       Health Maintenance   Topic Date Due    Diabetic foot exam  Never done    HIV screen  Never done    Diabetic retinal exam  Never done    Hepatitis C screen

## 2022-12-06 DIAGNOSIS — E78.5 HYPERLIPIDEMIA, UNSPECIFIED HYPERLIPIDEMIA TYPE: ICD-10-CM

## 2022-12-06 RX ORDER — ATORVASTATIN CALCIUM 40 MG/1
40 TABLET, FILM COATED ORAL DAILY
Qty: 30 TABLET | Refills: 11 | Status: SHIPPED | OUTPATIENT
Start: 2022-12-06

## 2022-12-06 NOTE — TELEPHONE ENCOUNTER
Request sent via [employer] for refill of Lipitor 40 mg qd. Last seen 10/20/22, next appt 4/20/23 with lipids due prior. Med verified. Order pended.

## 2022-12-06 NOTE — TELEPHONE ENCOUNTER
Rx EP'd to pharmacy. Please notify patient.       Requested Prescriptions     Signed Prescriptions Disp Refills    atorvastatin (LIPITOR) 40 MG tablet 30 tablet 11     Sig: Take 1 tablet by mouth daily     Authorizing Provider: Chris Hooks           Electronically signed by Harper Sellers MD on 12/6/2022 at 2:00 PM

## 2023-01-30 DIAGNOSIS — I10 ESSENTIAL HYPERTENSION: ICD-10-CM

## 2023-01-30 RX ORDER — ISOSORBIDE MONONITRATE 30 MG/1
TABLET, EXTENDED RELEASE ORAL
Qty: 90 TABLET | Refills: 1 | Status: SHIPPED | OUTPATIENT
Start: 2023-01-30

## 2023-01-30 NOTE — TELEPHONE ENCOUNTER
This medication refill is regarding a electronic request. Refill requested by  Samreen Dobbs . Requested Prescriptions     Pending Prescriptions Disp Refills    isosorbide mononitrate (IMDUR) 30 MG extended release tablet [Pharmacy Med Name: Isosorbide Mononitrate ER 30 MG Oral Tablet Extended Release 24 Hour] 90 tablet 1     Sig: Take 1 tablet by mouth once daily     Date of last visit: 10/20/2022   Date of next visit: 4/20/2023  Date of last refill: 7/20/22 #30/5    Rx verified, ordered and set to EP.

## 2023-02-01 ENCOUNTER — APPOINTMENT (OUTPATIENT)
Dept: GENERAL RADIOLOGY | Age: 43
End: 2023-02-01
Payer: COMMERCIAL

## 2023-02-01 ENCOUNTER — HOSPITAL ENCOUNTER (EMERGENCY)
Age: 43
Discharge: HOME | End: 2023-02-01
Payer: COMMERCIAL

## 2023-02-01 VITALS
HEART RATE: 67 BPM | BODY MASS INDEX: 40.43 KG/M2 | DIASTOLIC BLOOD PRESSURE: 73 MMHG | HEIGHT: 74 IN | OXYGEN SATURATION: 97 % | SYSTOLIC BLOOD PRESSURE: 129 MMHG | WEIGHT: 315 LBS | RESPIRATION RATE: 16 BRPM | TEMPERATURE: 98.9 F

## 2023-02-01 DIAGNOSIS — M54.6 ACUTE RIGHT-SIDED THORACIC BACK PAIN: ICD-10-CM

## 2023-02-01 DIAGNOSIS — S20.211A RIB CONTUSION, RIGHT, INITIAL ENCOUNTER: Primary | ICD-10-CM

## 2023-02-01 PROCEDURE — 71101 X-RAY EXAM UNILAT RIBS/CHEST: CPT

## 2023-02-01 PROCEDURE — 99213 OFFICE O/P EST LOW 20 MIN: CPT

## 2023-02-01 RX ORDER — IBUPROFEN 600 MG/1
600 TABLET ORAL 3 TIMES DAILY PRN
Qty: 30 TABLET | Refills: 0 | Status: SHIPPED | OUTPATIENT
Start: 2023-02-01

## 2023-02-01 RX ORDER — ACETAMINOPHEN 325 MG/1
650 TABLET ORAL EVERY 6 HOURS PRN
COMMUNITY

## 2023-02-01 RX ORDER — LIDOCAINE 4 G/G
1 PATCH TOPICAL DAILY
Qty: 30 PATCH | Refills: 0 | Status: SHIPPED | OUTPATIENT
Start: 2023-02-01 | End: 2023-03-03

## 2023-02-01 RX ORDER — CYCLOBENZAPRINE HCL 10 MG
10 TABLET ORAL NIGHTLY PRN
Qty: 10 TABLET | Refills: 0 | Status: SHIPPED | OUTPATIENT
Start: 2023-02-01 | End: 2023-02-11

## 2023-02-01 ASSESSMENT — ENCOUNTER SYMPTOMS
COLOR CHANGE: 0
SHORTNESS OF BREATH: 0
BACK PAIN: 1
COUGH: 0

## 2023-02-01 ASSESSMENT — PAIN SCALES - GENERAL: PAINLEVEL_OUTOF10: 8

## 2023-02-01 ASSESSMENT — PAIN DESCRIPTION - PAIN TYPE: TYPE: ACUTE PAIN

## 2023-02-01 ASSESSMENT — PAIN DESCRIPTION - FREQUENCY: FREQUENCY: CONTINUOUS

## 2023-02-01 ASSESSMENT — PAIN DESCRIPTION - LOCATION: LOCATION: BACK

## 2023-02-01 ASSESSMENT — PAIN DESCRIPTION - ORIENTATION: ORIENTATION: MID

## 2023-02-01 ASSESSMENT — PAIN - FUNCTIONAL ASSESSMENT: PAIN_FUNCTIONAL_ASSESSMENT: 0-10

## 2023-02-01 ASSESSMENT — PAIN DESCRIPTION - DESCRIPTORS: DESCRIPTORS: SHARP

## 2023-02-01 NOTE — ED PROVIDER NOTES
[address]  Urgent Care Encounter       CHIEF COMPLAINT       Chief Complaint   Patient presents with    Back Pain     Fall last Friday, slipped on ice       Nurses Notes reviewed and I agree except as noted in the HPI. HISTORY OF PRESENT ILLNESS   Baljinder Kate is a 38 y.o. male who presents for complaints of back pain/rib pain. Patient states 5 days ago he slipped on ice after getting out of a truck. He fell onto the ground. He has had mid back pain and right sided posterior rib pain since his fall. He states he has been tossing and turning at night because he cannot get comfortable. Pain increases when he takes deep breath and when he moves. Currently rates pain 8 on a 10 scale. HPI    REVIEW OF SYSTEMS     Review of Systems   Constitutional:  Negative for activity change, fatigue and fever. Respiratory:  Negative for cough and shortness of breath. Musculoskeletal:  Positive for back pain. Skin:  Negative for color change. Neurological:  Negative for dizziness and headaches. PAST MEDICAL HISTORY         Diagnosis Date    Hyperlipidemia     Hypertension     Type 2 diabetes mellitus without complication ([facility] [address]        SURGICALHISTORY     Patient  has a past surgical history that includes Ankle fracture surgery (Left, Age 25). CURRENT MEDICATIONS       Previous Medications    ACETAMINOPHEN (TYLENOL) 325 MG TABLET    Take 650 mg by mouth every 6 hours as needed for Pain    ATORVASTATIN (LIPITOR) 40 MG TABLET    Take 1 tablet by mouth daily    BLOOD GLUCOSE MONITOR KIT AND SUPPLIES    Accuchek brand (or brand covered by insurance)  Use daily as directed. Dx: E11.9    BLOOD GLUCOSE MONITOR STRIPS    Accuchek brand (or brand covered by insurance)  Test daily and prn. Dx: E11.9    CPAP MACHINE MISC    by Does not apply route Please change CPAP pressure to 12 cm H20.     GLIMEPIRIDE (AMARYL) 2 MG TABLET    Take 1 tablet by mouth 2 times daily    ISOSORBIDE MONONITRATE (IMDUR) 30 MG EXTENDED RELEASE TABLET    Take 1 tablet by mouth once daily    LANCETS [facility]    Accuchek lancets (or brand covered by insurance) Test daily and prn. Dx: E11.9    METOPROLOL SUCCINATE (TOPROL XL) 100 MG EXTENDED RELEASE TABLET    Take 1 tablet by mouth daily    OMEPRAZOLE (PRILOSEC) 20 MG DELAYED RELEASE CAPSULE    Take 20 mg by mouth daily       ALLERGIES     Patient is has No Known Allergies. Patients   There is no immunization history on file for this patient. FAMILY HISTORY     Patient's family history includes Anxiety Disorder in his mother; Diabetes in his father; Heart Disease in his paternal grandfather; High Blood Pressure in his father; No Known Problems in his brother; Other in his father; Stroke in his father. SOCIAL HISTORY     Patient  reports that he quit smoking about 23 years ago. His smoking use included cigarettes. He has never used smokeless tobacco. He reports current alcohol use. He reports that he does not use drugs. PHYSICAL EXAM     ED TRIAGE VITALS  BP: 129/73, Temp: 98.9 °F (37.2 °C), Heart Rate: 67, Resp: 16, SpO2: 97 %,Estimated body mass index is 41.09 kg/m² as calculated from the following:    Height as of this encounter: 6' 2\" (1.88 m). Weight as of this encounter: 320 lb (145.2 kg). ,No LMP for male patient. Physical Exam  Constitutional:       Appearance: He is obese. He is not ill-appearing. HENT:      Head: Normocephalic. Cardiovascular:      Rate and Rhythm: Normal rate and regular rhythm. Pulses: Normal pulses. Heart sounds: Normal heart sounds. Pulmonary:      Effort: Pulmonary effort is normal. No respiratory distress. Breath sounds: No wheezing or rhonchi. Comments: Tenderness to the right posterior ribs, approximately 7 through ninth rib area. He also has paraspinal tenderness and some midline posterior spine tenderness. No crepitus, subcutaneous emphysema or step-offs palpated.   No bruising  Abdominal:      General: Abdomen is flat. Bowel sounds are normal. There is no distension. Palpations: Abdomen is soft. Tenderness: There is no abdominal tenderness. Skin:     General: Skin is warm and dry. Capillary Refill: Capillary refill takes less than 2 seconds. Neurological:      General: No focal deficit present. Mental Status: He is alert. Psychiatric:         Mood and Affect: Mood normal.         Behavior: Behavior normal.       DIAGNOSTIC RESULTS     Labs:No results found for this visit on 02/01/23. IMAGING:    XR RIBS RIGHT INCLUDE CHEST (MIN 3 VIEWS)   Final Result   Cardiomegaly. No acute cardiopulmonary disease. **This report has been created using voice recognition software. It may contain minor errors which are inherent in voice recognition technology. **      Final report electronically signed by Dr. Kathy Shaw on 2/1/2023 1:37 PM            EKG:      URGENT CARE COURSE:     Vitals:    02/01/23 1311   BP: 129/73   Pulse: 67   Resp: 16   Temp: 98.9 °F (37.2 °C)   TempSrc: Temporal   SpO2: 97%   Weight: (!) 320 lb (145.2 kg)   Height: 6' 2\" (1.88 m)       Medications - No data to display         PROCEDURES:  None    FINAL IMPRESSION      1. Rib contusion, right, initial encounter    2. Acute right-sided thoracic back pain          DISPOSITION/ PLAN   Patient presents for what is likely a rib pain, rib contusion, thoracic spine contusion. X-rays were performed and negative for fractures. Patient will be discharged and advised to ice the area frequently. Prescription for ibuprofen and lidocaine patches for treatment of pain. 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 He may take Tylenol for additional pain control. Follow-up with occupational health next week if no significant improvement. He is able to return to work at full capacity.         PATIENT REFERRED TO:  Mary Winchester MD  [address] / [address] [address]      DISCHARGE MEDICATIONS:  New Prescriptions    CYCLOBENZAPRINE (FLEXERIL) 10 MG TABLET    Take 1 tablet by mouth nightly as needed for Muscle spasms Take after work.  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 Efforts were made to edit the dictations but occasionally words are mis-transcribed.)           SRIDEVI Ferreira CNP  02/01/23 8226

## 2023-02-01 NOTE — DISCHARGE INSTRUCTIONS
To the area frequently    Ibuprofen as directed. Take with food    May also take Tylenol as needed for pain    Flexeril, take after work/before bed. 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   Sooner if worse

## 2023-02-01 NOTE — ED NOTES
Patient discharge instructions given to pt and pt verbalized understanding, 3 px given, no other needs at this time, and pt left in stable condition.      Elvira Dumas RN  02/01/23 1400

## 2023-03-23 ENCOUNTER — OFFICE VISIT (OUTPATIENT)
Dept: PULMONOLOGY | Age: 43
End: 2023-03-23
Payer: COMMERCIAL

## 2023-03-23 VITALS
TEMPERATURE: 98.1 F | HEIGHT: 74 IN | DIASTOLIC BLOOD PRESSURE: 70 MMHG | WEIGHT: 315 LBS | BODY MASS INDEX: 40.43 KG/M2 | SYSTOLIC BLOOD PRESSURE: 136 MMHG | OXYGEN SATURATION: 94 % | HEART RATE: 61 BPM

## 2023-03-23 DIAGNOSIS — E66.01 OBESITY, MORBID, BMI 40.0-49.9 (HCC): ICD-10-CM

## 2023-03-23 DIAGNOSIS — I42.0 DILATED CARDIOMYOPATHY (HCC): ICD-10-CM

## 2023-03-23 DIAGNOSIS — G47.33 OSA ON CPAP: Primary | ICD-10-CM

## 2023-03-23 DIAGNOSIS — Z99.89 OSA ON CPAP: Primary | ICD-10-CM

## 2023-03-23 PROCEDURE — 1036F TOBACCO NON-USER: CPT

## 2023-03-23 PROCEDURE — 99214 OFFICE O/P EST MOD 30 MIN: CPT

## 2023-03-23 PROCEDURE — G8484 FLU IMMUNIZE NO ADMIN: HCPCS

## 2023-03-23 PROCEDURE — 3075F SYST BP GE 130 - 139MM HG: CPT

## 2023-03-23 PROCEDURE — 3078F DIAST BP <80 MM HG: CPT

## 2023-03-23 PROCEDURE — G8417 CALC BMI ABV UP PARAM F/U: HCPCS

## 2023-03-23 PROCEDURE — G8427 DOCREV CUR MEDS BY ELIG CLIN: HCPCS

## 2023-03-23 ASSESSMENT — ENCOUNTER SYMPTOMS
SHORTNESS OF BREATH: 0
COUGH: 0
WHEEZING: 0
SINUS PRESSURE: 0
SINUS PAIN: 0
RHINORRHEA: 0

## 2023-03-23 NOTE — PROGRESS NOTES
Systems -   Review of Systems   Constitutional:  Negative for appetite change, fever and unexpected weight change. HENT:  Negative for congestion, postnasal drip, rhinorrhea, sinus pressure, sinus pain and sneezing. Respiratory:  Negative for cough, shortness of breath and wheezing. Cardiovascular:  Positive for chest pain (\"once in a blue moon\"). Negative for palpitations and leg swelling. Follows with Dr. Luigi Catalan   Allergic/Immunologic: Positive for environmental allergies. Physical Exam:    BMI:  Body mass index is 42.32 kg/m². Wt Readings from Last 3 Encounters:   03/23/23 (!) 329 lb 9.6 oz (149.5 kg)   02/01/23 (!) 320 lb (145.2 kg)   10/20/22 (!) 323 lb 3.2 oz (146.6 kg)     Vitals: /70 (Site: Right Upper Arm, Position: Sitting, Cuff Size: Large Adult)   Pulse 61   Temp 98.1 °F (36.7 °C) (Oral)   Ht 6' 2\" (1.88 m)   Wt (!) 329 lb 9.6 oz (149.5 kg)   SpO2 94% Comment: r/a  BMI 42.32 kg/m²       Physical Exam  Nursing note reviewed. Constitutional:       General: He is not in acute distress. Appearance: He is obese. HENT:      Head: Normocephalic and atraumatic. Right Ear: External ear normal.      Left Ear: External ear normal.      Mouth/Throat:      Mouth: Mucous membranes are moist.      Pharynx: No oropharyngeal exudate or posterior oropharyngeal erythema. Eyes:      General:         Right eye: No discharge. Left eye: No discharge. Cardiovascular:      Rate and Rhythm: Normal rate. Pulmonary:      Effort: Pulmonary effort is normal. No respiratory distress. Breath sounds: No wheezing, rhonchi or rales. Musculoskeletal:      Cervical back: Neck supple. Skin:     General: Skin is warm and dry. Neurological:      General: No focal deficit present. Mental Status: He is alert. Psychiatric:         Mood and Affect: Mood normal.         Behavior: Behavior normal.         Thought Content:  Thought content normal.

## 2023-04-20 ENCOUNTER — OFFICE VISIT (OUTPATIENT)
Dept: FAMILY MEDICINE CLINIC | Age: 43
End: 2023-04-20
Payer: COMMERCIAL

## 2023-04-20 VITALS
WEIGHT: 315 LBS | HEART RATE: 72 BPM | SYSTOLIC BLOOD PRESSURE: 118 MMHG | DIASTOLIC BLOOD PRESSURE: 70 MMHG | TEMPERATURE: 97.7 F | BODY MASS INDEX: 42.52 KG/M2 | RESPIRATION RATE: 16 BRPM

## 2023-04-20 DIAGNOSIS — I10 ESSENTIAL HYPERTENSION: ICD-10-CM

## 2023-04-20 DIAGNOSIS — Z00.00 ANNUAL PHYSICAL EXAM: Primary | ICD-10-CM

## 2023-04-20 DIAGNOSIS — E11.9 TYPE 2 DIABETES MELLITUS WITHOUT COMPLICATION, WITHOUT LONG-TERM CURRENT USE OF INSULIN (HCC): ICD-10-CM

## 2023-04-20 LAB — HBA1C MFR BLD: 8 % (ref 4.3–5.7)

## 2023-04-20 PROCEDURE — 3074F SYST BP LT 130 MM HG: CPT | Performed by: FAMILY MEDICINE

## 2023-04-20 PROCEDURE — 83036 HEMOGLOBIN GLYCOSYLATED A1C: CPT | Performed by: FAMILY MEDICINE

## 2023-04-20 PROCEDURE — 99396 PREV VISIT EST AGE 40-64: CPT | Performed by: FAMILY MEDICINE

## 2023-04-20 PROCEDURE — 3078F DIAST BP <80 MM HG: CPT | Performed by: FAMILY MEDICINE

## 2023-04-20 RX ORDER — METOPROLOL SUCCINATE 100 MG/1
100 TABLET, EXTENDED RELEASE ORAL DAILY
Qty: 30 TABLET | Refills: 11 | Status: SHIPPED | OUTPATIENT
Start: 2023-04-20

## 2023-04-20 RX ORDER — GLIMEPIRIDE 4 MG/1
4 TABLET ORAL 2 TIMES DAILY
Qty: 60 TABLET | Refills: 11 | Status: SHIPPED | OUTPATIENT
Start: 2023-04-20

## 2023-04-20 ASSESSMENT — PATIENT HEALTH QUESTIONNAIRE - PHQ9
SUM OF ALL RESPONSES TO PHQ QUESTIONS 1-9: 0
2. FEELING DOWN, DEPRESSED OR HOPELESS: 0
SUM OF ALL RESPONSES TO PHQ QUESTIONS 1-9: 0
1. LITTLE INTEREST OR PLEASURE IN DOING THINGS: 0
SUM OF ALL RESPONSES TO PHQ9 QUESTIONS 1 & 2: 0
SUM OF ALL RESPONSES TO PHQ QUESTIONS 1-9: 0
SUM OF ALL RESPONSES TO PHQ QUESTIONS 1-9: 0

## 2023-04-20 NOTE — PROGRESS NOTES
succinate (TOPROL XL) 100 MG extended release tablet; Take 1 tablet by mouth daily, Disp-30 tablet, R-11Normal    Increase glimepiride to 4 mg p.o. twice daily for better control of blood sugars. We reviewed goal hemoglobin A1c of less than 7% today. He will try to get back to an ADA diet and increased activity to reduce his weight and blood sugars. Continue current medications for hypertension. Med refill as above.     Yearly dilated eye exam recommended    Follow-up in 3 months with hemoglobin A1c at the visit           --Meño Adam MD

## 2023-04-22 ASSESSMENT — ENCOUNTER SYMPTOMS
EYES NEGATIVE: 1
BLOOD IN STOOL: 0
ABDOMINAL PAIN: 0
SHORTNESS OF BREATH: 0
CHEST TIGHTNESS: 0

## 2023-05-01 ENCOUNTER — OFFICE VISIT (OUTPATIENT)
Dept: CARDIOLOGY CLINIC | Age: 43
End: 2023-05-01
Payer: COMMERCIAL

## 2023-05-01 VITALS
HEIGHT: 74 IN | BODY MASS INDEX: 40.43 KG/M2 | HEART RATE: 58 BPM | WEIGHT: 315 LBS | SYSTOLIC BLOOD PRESSURE: 144 MMHG | DIASTOLIC BLOOD PRESSURE: 76 MMHG

## 2023-05-01 DIAGNOSIS — R06.02 SOB (SHORTNESS OF BREATH): ICD-10-CM

## 2023-05-01 DIAGNOSIS — I10 PRIMARY HYPERTENSION: ICD-10-CM

## 2023-05-01 DIAGNOSIS — I25.10 CORONARY ARTERY DISEASE INVOLVING NATIVE CORONARY ARTERY OF NATIVE HEART WITHOUT ANGINA PECTORIS: Primary | ICD-10-CM

## 2023-05-01 PROCEDURE — G8427 DOCREV CUR MEDS BY ELIG CLIN: HCPCS | Performed by: NUCLEAR MEDICINE

## 2023-05-01 PROCEDURE — G8417 CALC BMI ABV UP PARAM F/U: HCPCS | Performed by: NUCLEAR MEDICINE

## 2023-05-01 PROCEDURE — 93000 ELECTROCARDIOGRAM COMPLETE: CPT | Performed by: NUCLEAR MEDICINE

## 2023-05-01 PROCEDURE — 99213 OFFICE O/P EST LOW 20 MIN: CPT | Performed by: NUCLEAR MEDICINE

## 2023-05-01 PROCEDURE — 3077F SYST BP >= 140 MM HG: CPT | Performed by: NUCLEAR MEDICINE

## 2023-05-01 PROCEDURE — 3078F DIAST BP <80 MM HG: CPT | Performed by: NUCLEAR MEDICINE

## 2023-05-01 PROCEDURE — 1036F TOBACCO NON-USER: CPT | Performed by: NUCLEAR MEDICINE

## 2023-08-13 DIAGNOSIS — I10 ESSENTIAL HYPERTENSION: ICD-10-CM

## 2023-08-14 RX ORDER — ISOSORBIDE MONONITRATE 30 MG/1
TABLET, EXTENDED RELEASE ORAL
Qty: 90 TABLET | Refills: 1 | Status: SHIPPED | OUTPATIENT
Start: 2023-08-14

## 2023-08-14 NOTE — TELEPHONE ENCOUNTER
This medication refill is regarding a electronic request. Refill requested by  Jzazmine Barrios . Requested Prescriptions     Pending Prescriptions Disp Refills    isosorbide mononitrate (IMDUR) 30 MG extended release tablet [Pharmacy Med Name: Isosorbide Mononitrate ER 30 MG Oral Tablet Extended Release 24 Hour] 90 tablet 0     Sig: Take 1 tablet by mouth once daily     Date of last visit: 4/20/2023   Date of next visit: 8/16/2023  Date of last refill: 1/30/23 #90/1    Rx verified, ordered and set to EP.

## 2023-08-16 ENCOUNTER — OFFICE VISIT (OUTPATIENT)
Dept: FAMILY MEDICINE CLINIC | Age: 43
End: 2023-08-16
Payer: COMMERCIAL

## 2023-08-16 VITALS
WEIGHT: 315 LBS | DIASTOLIC BLOOD PRESSURE: 70 MMHG | BODY MASS INDEX: 42.06 KG/M2 | SYSTOLIC BLOOD PRESSURE: 120 MMHG | TEMPERATURE: 97.9 F | RESPIRATION RATE: 16 BRPM | HEART RATE: 72 BPM

## 2023-08-16 DIAGNOSIS — E11.9 TYPE 2 DIABETES MELLITUS WITHOUT COMPLICATION, WITHOUT LONG-TERM CURRENT USE OF INSULIN (HCC): ICD-10-CM

## 2023-08-16 DIAGNOSIS — I10 ESSENTIAL HYPERTENSION: ICD-10-CM

## 2023-08-16 DIAGNOSIS — G89.29 CHRONIC PAIN OF RIGHT KNEE: Primary | ICD-10-CM

## 2023-08-16 DIAGNOSIS — M25.561 CHRONIC PAIN OF RIGHT KNEE: Primary | ICD-10-CM

## 2023-08-16 PROCEDURE — 3052F HG A1C>EQUAL 8.0%<EQUAL 9.0%: CPT | Performed by: FAMILY MEDICINE

## 2023-08-16 PROCEDURE — 2022F DILAT RTA XM EVC RTNOPTHY: CPT | Performed by: FAMILY MEDICINE

## 2023-08-16 PROCEDURE — 99213 OFFICE O/P EST LOW 20 MIN: CPT | Performed by: FAMILY MEDICINE

## 2023-08-16 PROCEDURE — 1036F TOBACCO NON-USER: CPT | Performed by: FAMILY MEDICINE

## 2023-08-16 PROCEDURE — 3078F DIAST BP <80 MM HG: CPT | Performed by: FAMILY MEDICINE

## 2023-08-16 PROCEDURE — 3074F SYST BP LT 130 MM HG: CPT | Performed by: FAMILY MEDICINE

## 2023-08-16 PROCEDURE — G8417 CALC BMI ABV UP PARAM F/U: HCPCS | Performed by: FAMILY MEDICINE

## 2023-08-16 PROCEDURE — G8427 DOCREV CUR MEDS BY ELIG CLIN: HCPCS | Performed by: FAMILY MEDICINE

## 2023-08-16 RX ORDER — FAMOTIDINE 40 MG/1
TABLET, FILM COATED ORAL
COMMUNITY
Start: 2023-07-19

## 2023-08-16 RX ORDER — MELOXICAM 15 MG/1
15 TABLET ORAL DAILY
Qty: 30 TABLET | Refills: 0 | Status: SHIPPED | OUTPATIENT
Start: 2023-08-16

## 2023-08-16 SDOH — ECONOMIC STABILITY: INCOME INSECURITY: HOW HARD IS IT FOR YOU TO PAY FOR THE VERY BASICS LIKE FOOD, HOUSING, MEDICAL CARE, AND HEATING?: NOT HARD AT ALL

## 2023-08-16 SDOH — ECONOMIC STABILITY: FOOD INSECURITY: WITHIN THE PAST 12 MONTHS, YOU WORRIED THAT YOUR FOOD WOULD RUN OUT BEFORE YOU GOT MONEY TO BUY MORE.: NEVER TRUE

## 2023-08-16 SDOH — ECONOMIC STABILITY: FOOD INSECURITY: WITHIN THE PAST 12 MONTHS, THE FOOD YOU BOUGHT JUST DIDN'T LAST AND YOU DIDN'T HAVE MONEY TO GET MORE.: NEVER TRUE

## 2023-08-16 SDOH — ECONOMIC STABILITY: HOUSING INSECURITY
IN THE LAST 12 MONTHS, WAS THERE A TIME WHEN YOU DID NOT HAVE A STEADY PLACE TO SLEEP OR SLEPT IN A SHELTER (INCLUDING NOW)?: NO

## 2023-08-16 ASSESSMENT — ENCOUNTER SYMPTOMS
GASTROINTESTINAL NEGATIVE: 1
RESPIRATORY NEGATIVE: 1

## 2023-08-16 NOTE — PROGRESS NOTES
2023    Mukesh Iqbal (:  1980) is a 41 y.o. male, Established patient, here for evaluation of the following chief complaint(s):  Knee Pain (Right knee pain x 3 months.)      ASSESSMENT/PLAN:    1. Chronic pain of right knee  -     XR KNEE RIGHT (3 VIEWS); Future  -     meloxicam (MOBIC) 15 MG tablet; Take 1 tablet by mouth daily, Disp-30 tablet, R-0Normal  2. Essential hypertension  -     Comprehensive Metabolic Panel; Future  -     Lipid Panel; Future  3. Type 2 diabetes mellitus without complication, without long-term current use of insulin (HCC)  -     Comprehensive Metabolic Panel; Future  -     Lipid Panel; Future  -     Hemoglobin A1C; Future  -     Microalbumin / Creatinine Urine Ratio; Future      Proceed with x-rays of the right knee as above due to chronic knee pain over the last 3 months    Short-term prescription for meloxicam 15 mg daily to help with inflammation and swelling    He will also do ice and compression as needed    Labs as above to follow-up on chronic conditions    He may ultimately need an MRI of the right knee if his x-rays are normal and his symptoms or not improved. His exam is suspicious for a possible meniscal tear. SUBJECTIVE/OBJECTIVE:    HPI    Patient today with a 3-month history of right knee pain and swelling. 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 The knee is sometimes swollen. He has tried some OTC Tylenol and intermittent icing which has helped minimally. No history of any remote injury. He is also due for lab testing for follow-up of his chronic conditions. Review of Systems   Constitutional:  Negative for fever. HENT: Negative. Respiratory: Negative. Cardiovascular: Negative. Gastrointestinal: Negative. Genitourinary: Negative. Musculoskeletal:  Positive for arthralgias (right knee), gait problem and joint swelling (right knee).    All other systems reviewed and are

## 2023-08-21 ENCOUNTER — HOSPITAL ENCOUNTER (OUTPATIENT)
Age: 43
Discharge: HOME | End: 2023-08-21
Payer: COMMERCIAL

## 2023-08-21 ENCOUNTER — HOSPITAL ENCOUNTER (OUTPATIENT)
Dept: GENERAL RADIOLOGY | Age: 43
Discharge: HOME | End: 2023-08-21
Attending: FAMILY MEDICINE
Payer: COMMERCIAL

## 2023-08-21 DIAGNOSIS — G89.29 CHRONIC PAIN OF RIGHT KNEE: ICD-10-CM

## 2023-08-21 DIAGNOSIS — M25.561 CHRONIC PAIN OF RIGHT KNEE: ICD-10-CM

## 2023-08-21 PROCEDURE — 73562 X-RAY EXAM OF KNEE 3: CPT

## 2023-08-22 ENCOUNTER — TELEPHONE (OUTPATIENT)
Dept: FAMILY MEDICINE CLINIC | Age: 43
End: 2023-08-22

## 2023-08-22 DIAGNOSIS — M25.561 CHRONIC PAIN OF RIGHT KNEE: Primary | ICD-10-CM

## 2023-08-22 DIAGNOSIS — G89.29 CHRONIC PAIN OF RIGHT KNEE: Primary | ICD-10-CM

## 2023-08-23 NOTE — TELEPHONE ENCOUNTER
----- Message from Edwin uTbbs MD sent at 8/21/2023  4:47 PM EDT -----  Notify him that his knee xray is normal.  Get MRI of the right knee without contrast.  Dx:  chronic right knee pain.  CG
LMTCB to discuss results and CG response. MRI ordered and patient scheduled at [address] [place] [address] on 9/8/23 at 10:30 AM.    Notify patient of appt.
Patient notified of results and MRI appt. and was agreeable to everything.
medical evaluation
154.94

## 2023-09-08 ENCOUNTER — HOSPITAL ENCOUNTER (OUTPATIENT)
Dept: MRI IMAGING | Age: 43
Discharge: HOME | End: 2023-09-08
Attending: FAMILY MEDICINE
Payer: COMMERCIAL

## 2023-09-08 DIAGNOSIS — G89.29 CHRONIC PAIN OF RIGHT KNEE: ICD-10-CM

## 2023-09-08 DIAGNOSIS — M25.561 CHRONIC PAIN OF RIGHT KNEE: ICD-10-CM

## 2023-09-08 PROCEDURE — 73721 MRI JNT OF LWR EXTRE W/O DYE: CPT

## 2023-09-11 ENCOUNTER — PATIENT MESSAGE (OUTPATIENT)
Dept: FAMILY MEDICINE CLINIC | Age: 43
End: 2023-09-11

## 2023-09-11 ENCOUNTER — TELEPHONE (OUTPATIENT)
Dept: FAMILY MEDICINE CLINIC | Age: 43
End: 2023-09-11

## 2023-09-11 DIAGNOSIS — S83.241A TEAR OF MEDIAL MENISCUS OF RIGHT KNEE, UNSPECIFIED TEAR TYPE, UNSPECIFIED WHETHER OLD OR CURRENT TEAR, INITIAL ENCOUNTER: Primary | ICD-10-CM

## 2023-09-11 NOTE — TELEPHONE ENCOUNTER
----- Message from Silverio Breen MD sent at 9/9/2023  2:18 PM EDT -----  Shawanda Maloney that his MRI of the knee shows a medial meniscus (cartilage) tear. Refer to ortho.  CG

## 2023-09-11 NOTE — TELEPHONE ENCOUNTER
From: Rai Barnard  To: Dr. Gong Prime: 9/11/2023 3:32 PM EDT  Subject: Knee    I want to go to [facility] ortho

## 2023-09-12 DIAGNOSIS — M25.561 CHRONIC PAIN OF RIGHT KNEE: ICD-10-CM

## 2023-09-12 DIAGNOSIS — G89.29 CHRONIC PAIN OF RIGHT KNEE: ICD-10-CM

## 2023-09-12 RX ORDER — MELOXICAM 15 MG/1
15 TABLET ORAL DAILY
Qty: 30 TABLET | Refills: 0 | Status: SHIPPED | OUTPATIENT
Start: 2023-09-12

## 2023-09-12 NOTE — TELEPHONE ENCOUNTER
Last visit- 8/16/2023  Next visit- Visit date not found    Requested Prescriptions     Pending Prescriptions Disp Refills    meloxicam (MOBIC) 15 MG tablet [Pharmacy Med Name: Meloxicam 15 MG Oral Tablet] 30 tablet 0     Sig: Take 1 tablet by mouth once daily

## 2023-09-13 NOTE — TELEPHONE ENCOUNTER
[address], [address]  [facility], [address] [facility]  [phone]    Referral sent to Fax: [phone]    They will call him to schedule. Imaging reports faxed as well.

## 2023-09-23 ENCOUNTER — HOSPITAL ENCOUNTER (OUTPATIENT)
Age: 43
Discharge: HOME | End: 2023-09-23
Payer: COMMERCIAL

## 2023-09-23 DIAGNOSIS — E11.9 TYPE 2 DIABETES MELLITUS WITHOUT COMPLICATION, WITHOUT LONG-TERM CURRENT USE OF INSULIN (HCC): ICD-10-CM

## 2023-09-23 DIAGNOSIS — I10 ESSENTIAL HYPERTENSION: ICD-10-CM

## 2023-09-23 LAB
ALBUMIN SERPL BCG-MCNC: 4.2 G/DL (ref 3.5–5.1)
ALP SERPL-CCNC: 98 U/L (ref 38–126)
ALT SERPL W/O P-5'-P-CCNC: 61 U/L (ref 11–66)
ANION GAP SERPL CALC-SCNC: 6 MEQ/L (ref 8–16)
AST SERPL-CCNC: 31 U/L (ref 5–40)
BILIRUB SERPL-MCNC: 0.4 MG/DL (ref 0.3–1.2)
BUN SERPL-MCNC: 12 MG/DL (ref 7–22)
CALCIUM SERPL-MCNC: 9.3 MG/DL (ref 8.5–10.5)
CHLORIDE SERPL-SCNC: 103 MEQ/L (ref 98–111)
CHOLEST SERPL-MCNC: 163 MG/DL (ref 100–199)
CO2 SERPL-SCNC: 29 MEQ/L (ref 23–33)
CREAT SERPL-MCNC: 1.1 MG/DL (ref 0.4–1.2)
CREAT UR-MCNC: 150.2 MG/DL
DEPRECATED MEAN GLUCOSE BLD GHB EST-ACNC: 186 MG/DL (ref 70–126)
GFR SERPL CREATININE-BSD FRML MDRD: > 60 ML/MIN/1.73M2
GLUCOSE SERPL-MCNC: 190 MG/DL (ref 70–108)
HBA1C MFR BLD HPLC: 8.2 % (ref 4.4–6.4)
HDLC SERPL-MCNC: 32 MG/DL
LDLC SERPL CALC-MCNC: 100 MG/DL
MICROALBUMIN UR-MCNC: < 1.2 MG/DL
MICROALBUMIN/CREAT RATIO PNL UR: 8 MG/G (ref 0–30)
POTASSIUM SERPL-SCNC: 4.8 MEQ/L (ref 3.5–5.2)
PROT SERPL-MCNC: 6.3 G/DL (ref 6.1–8)
SODIUM SERPL-SCNC: 138 MEQ/L (ref 135–145)
TRIGL SERPL-MCNC: 157 MG/DL (ref 0–199)

## 2023-09-23 PROCEDURE — 82043 UR ALBUMIN QUANTITATIVE: CPT

## 2023-09-23 PROCEDURE — 83036 HEMOGLOBIN GLYCOSYLATED A1C: CPT

## 2023-09-23 PROCEDURE — 80061 LIPID PANEL: CPT

## 2023-09-23 PROCEDURE — 80053 COMPREHEN METABOLIC PANEL: CPT

## 2023-09-23 PROCEDURE — 36415 COLL VENOUS BLD VENIPUNCTURE: CPT

## 2023-09-26 ENCOUNTER — TELEPHONE (OUTPATIENT)
Dept: FAMILY MEDICINE CLINIC | Age: 43
End: 2023-09-26

## 2023-09-26 NOTE — TELEPHONE ENCOUNTER
----- Message from Kenia Sanchez MD sent at 9/24/2023  2:37 PM EDT -----  Gavi Boyd that his labs show elevated HbA1C at 8.2%, indicating that his diabetes is out of control. He is on the max dose of amaryl and didn't tolerate metformin in the past.  Coby Byers was not covered by his insurance. See if we could get Alcario Mariza covered for him at 5mg po qd. He will be due for appt in October.  CG

## 2023-09-27 NOTE — TELEPHONE ENCOUNTER
Community Care Team Documentation for Patient in [facility] Subsequent Follow up Patient remains at [address] ([facility]). See previous [facility] Team notes. PCP : Julian Reed MD 
Nurse Navigator in PCP office: Brandt Saenz Spoke with SNF team.  PT/OT continue. Currently supervision with bed mobility. Contact guard to min assist with transfers. Ambulating with rolling walker and contact guard to occasional min assist due to foot clearance issues. Supervision with upper body bathing and dressing. Max assist with lower body bathing and dressing. Min assist with toileting. Anticipate 3 more weeks LOS. Plan for discharge to home with daughter and son-in-law with [facility]. PCP follow up 5/1 at 10:30 AM. Medications were not reconciled and general patient assessment was not completed during this skilled nursing facility outreach. Inocencio Whittington, MSN, RN, ACNS-BC, [facility] Nurse Navigator, [address] [place] [address] [phone] Spoke with pt and notified him of results and providers recommendation. Pt verbalized understanding. Pt is agreeable to trying Emilia and would like to wait to start medication after 10/1/23 due to getting new insurance. Pt will give us new information with insurance next week.

## 2023-09-28 DIAGNOSIS — I10 ESSENTIAL HYPERTENSION: ICD-10-CM

## 2023-09-28 RX ORDER — ISOSORBIDE MONONITRATE 30 MG/1
30 TABLET, EXTENDED RELEASE ORAL DAILY
Qty: 90 TABLET | Refills: 1 | Status: CANCELLED | OUTPATIENT
Start: 2023-09-28

## 2023-10-11 DIAGNOSIS — M25.561 CHRONIC PAIN OF RIGHT KNEE: ICD-10-CM

## 2023-10-11 DIAGNOSIS — G89.29 CHRONIC PAIN OF RIGHT KNEE: ICD-10-CM

## 2023-10-11 RX ORDER — MELOXICAM 15 MG/1
15 TABLET ORAL DAILY
Qty: 30 TABLET | Refills: 0 | Status: SHIPPED | OUTPATIENT
Start: 2023-10-11

## 2023-10-11 NOTE — TELEPHONE ENCOUNTER
This medication refill is regarding a electronic request. Refill requested by  Magalie Ray . Requested Prescriptions     Pending Prescriptions Disp Refills    meloxicam (MOBIC) 15 MG tablet [Pharmacy Med Name: Meloxicam 15 MG Oral Tablet] 30 tablet 0     Sig: Take 1 tablet by mouth once daily     Date of last visit: 8/16/2023   Date of next visit: 10/18/2023  Date of last refill: 9/12/23 #30/0    Rx verified, ordered and set to EP.

## 2023-10-18 ENCOUNTER — HOSPITAL ENCOUNTER (OUTPATIENT)
Age: 43
Discharge: HOME | End: 2023-10-18
Payer: COMMERCIAL

## 2023-10-18 ENCOUNTER — OFFICE VISIT (OUTPATIENT)
Dept: FAMILY MEDICINE CLINIC | Age: 43
End: 2023-10-18
Payer: COMMERCIAL

## 2023-10-18 ENCOUNTER — TELEPHONE (OUTPATIENT)
Dept: CARDIOLOGY CLINIC | Age: 43
End: 2023-10-18

## 2023-10-18 VITALS
BODY MASS INDEX: 42.47 KG/M2 | RESPIRATION RATE: 16 BRPM | DIASTOLIC BLOOD PRESSURE: 82 MMHG | WEIGHT: 315 LBS | TEMPERATURE: 97.8 F | HEART RATE: 72 BPM | SYSTOLIC BLOOD PRESSURE: 130 MMHG

## 2023-10-18 DIAGNOSIS — E78.5 HYPERLIPIDEMIA, UNSPECIFIED HYPERLIPIDEMIA TYPE: ICD-10-CM

## 2023-10-18 DIAGNOSIS — E11.65 TYPE 2 DIABETES MELLITUS WITH HYPERGLYCEMIA, WITHOUT LONG-TERM CURRENT USE OF INSULIN (HCC): Primary | ICD-10-CM

## 2023-10-18 DIAGNOSIS — E66.01 MORBID OBESITY WITH BMI OF 40.0-44.9, ADULT (HCC): ICD-10-CM

## 2023-10-18 LAB
ANION GAP SERPL CALC-SCNC: 10 MEQ/L (ref 8–16)
BUN SERPL-MCNC: 14 MG/DL (ref 7–22)
CHLORIDE SERPL-SCNC: 102 MEQ/L (ref 98–111)
CO2 SERPL-SCNC: 26 MEQ/L (ref 23–33)
CREAT SERPL-MCNC: 1.1 MG/DL (ref 0.4–1.2)
DEPRECATED MEAN GLUCOSE BLD GHB EST-ACNC: 189 MG/DL (ref 70–126)
DEPRECATED RDW RBC AUTO: 40.6 FL (ref 35–45)
EKG Q-T INTERVAL: 370 MS
EKG QRS DURATION: 88 MS
EKG QTC CALCULATION (BAZETT): 357 MS
EKG R AXIS: -19 DEGREES
EKG T AXIS: -32 DEGREES
EKG VENTRICULAR RATE: 56 BPM
ERYTHROCYTE [DISTWIDTH] IN BLOOD BY AUTOMATED COUNT: 13.2 % (ref 11.5–14.5)
GFR SERPL CREATININE-BSD FRML MDRD: > 60 ML/MIN/1.73M2
GLUCOSE SERPL-MCNC: 174 MG/DL (ref 70–108)
HBA1C MFR BLD HPLC: 8.3 % (ref 4.4–6.4)
HCT VFR BLD AUTO: 43.2 % (ref 42–52)
HGB BLD-MCNC: 14.4 GM/DL (ref 14–18)
MCH RBC QN AUTO: 28 PG (ref 26–33)
MCHC RBC AUTO-ENTMCNC: 33.3 GM/DL (ref 32.2–35.5)
MCV RBC AUTO: 84 FL (ref 80–94)
PLATELET # BLD AUTO: 206 THOU/MM3 (ref 130–400)
PMV BLD AUTO: 9.4 FL (ref 9.4–12.4)
POTASSIUM SERPL-SCNC: 4.2 MEQ/L (ref 3.5–5.2)
RBC # BLD AUTO: 5.14 MILL/MM3 (ref 4.7–6.1)
SODIUM SERPL-SCNC: 138 MEQ/L (ref 135–145)
WBC # BLD AUTO: 4.7 THOU/MM3 (ref 4.8–10.8)

## 2023-10-18 PROCEDURE — 85027 COMPLETE CBC AUTOMATED: CPT

## 2023-10-18 PROCEDURE — 83036 HEMOGLOBIN GLYCOSYLATED A1C: CPT

## 2023-10-18 PROCEDURE — 82947 ASSAY GLUCOSE BLOOD QUANT: CPT

## 2023-10-18 PROCEDURE — 99214 OFFICE O/P EST MOD 30 MIN: CPT | Performed by: FAMILY MEDICINE

## 2023-10-18 PROCEDURE — 36415 COLL VENOUS BLD VENIPUNCTURE: CPT

## 2023-10-18 PROCEDURE — 3075F SYST BP GE 130 - 139MM HG: CPT | Performed by: FAMILY MEDICINE

## 2023-10-18 PROCEDURE — 3079F DIAST BP 80-89 MM HG: CPT | Performed by: FAMILY MEDICINE

## 2023-10-18 PROCEDURE — 82565 ASSAY OF CREATININE: CPT

## 2023-10-18 PROCEDURE — 80051 ELECTROLYTE PANEL: CPT

## 2023-10-18 PROCEDURE — 84520 ASSAY OF UREA NITROGEN: CPT

## 2023-10-18 PROCEDURE — 93005 ELECTROCARDIOGRAM TRACING: CPT | Performed by: ORTHOPAEDIC SURGERY

## 2023-10-18 PROCEDURE — 3052F HG A1C>EQUAL 8.0%<EQUAL 9.0%: CPT | Performed by: FAMILY MEDICINE

## 2023-10-18 ASSESSMENT — ENCOUNTER SYMPTOMS
BLOOD IN STOOL: 0
SHORTNESS OF BREATH: 0
EYES NEGATIVE: 1
ABDOMINAL PAIN: 0
CHEST TIGHTNESS: 0

## 2023-10-18 NOTE — TELEPHONE ENCOUNTER
Pre op Risk Assessment    Procedure right knee sx  [facility]   Date of surgery/procedure 11/7    Last OV 5/1/23  Last Stress 8/31/20  Last Echo 9/21/20  Last Cath 9/22/20  Last Stent none in chart   Is patient on blood thinners none in chart   Hold Meds/how many days      Fax [phone]

## 2023-10-19 NOTE — PROGRESS NOTES
4/20/2023 9/23/2023 10/18/2023   Glucose, Random      70 - 108 mg/dL  190 (H)     Creatinine      0.4 - 1.2 mg/dL  1.1     BUN,BUNPL      7 - 22 mg/dL  12     Sodium      135 - 145 meq/L  138     Potassium      3.5 - 5.2 meq/L  4.8     Chloride      98 - 111 meq/L  103     CO2      23 - 33 meq/L  29     CALCIUM, SERUM, 894138      8.5 - 10.5 mg/dL  9.3     AST      5 - 40 U/L  31     Alk Phos      38 - 126 U/L  98     Total Protein      6.1 - 8.0 g/dL  6.3     Albumin      3.5 - 5.1 g/dL  4.2     BILIRUBIN TOTAL      0.3 - 1.2 mg/dL  0.4     ALT      11 - 66 U/L  61     WBC      4.8 - 10.8 thou/mm3   4.7 (L)    RBC      4.70 - 6.10 mill/mm3   5.14    Hemoglobin Quant      14.0 - 18.0 gm/dl   14.4    Hematocrit      42.0 - 52.0 %   43.2    MCV      80.0 - 94.0 fL   84.0    MCH      26.0 - 33.0 pg   28.0    MCHC      32.2 - 35.5 gm/dl   33.3    RDW-CV      11.5 - 14.5 %   13.2    RDW-SD      35.0 - 45.0 fL   40.6    Platelet Count      616 - 400 thou/mm3   206    MPV      9.4 - 12.4 fL   9.4    Cholesterol, Total      100 - 199 mg/dL  163     Triglycerides      0 - 199 mg/dL  157     HDL Cholesterol      mg/dL  32     LDL Calculated      mg/dL  100     Microalbumin, Random Urine      mg/dL  < 1.20     Creatinine, Urine      mg/dL  150.2     Microalb/Creat Ratio POC      0 - 30 mg/g  8     Hemoglobin A1C      4.4 - 6.4 % 8.0 (H)  8.2 (H)     AVERAGE GLUCOSE      70 - 126 mg/dL  186 (H)     Anion Gap      8.0 - 16.0 meq/L  6.0 (L)     Est, Glom Filt Rate      >60 ml/min/1.73m2  >60        Legend:  (H) High  (L) Low      An electronic signature was used to authenticate this note.         Electronically signed by Manolo Barton MD on 10/18/2023 at 9:15 PM

## 2023-10-30 ENCOUNTER — TELEPHONE (OUTPATIENT)
Dept: FAMILY MEDICINE CLINIC | Age: 43
End: 2023-10-30

## 2023-10-30 DIAGNOSIS — E11.65 TYPE 2 DIABETES MELLITUS WITH HYPERGLYCEMIA, WITHOUT LONG-TERM CURRENT USE OF INSULIN (HCC): Primary | ICD-10-CM

## 2023-11-02 NOTE — TELEPHONE ENCOUNTER
PA was denied stating drug is not covered for obesity. CoverMyMeds PA reviewed and the primary diagnosis code used was obesity, not diabetes. New PA form completed and faxed to [facility] along with the pts most recent office note. Will await response.     FAX: [phone] [address] [address]: [phone]

## 2023-11-09 DIAGNOSIS — M25.561 CHRONIC PAIN OF RIGHT KNEE: ICD-10-CM

## 2023-11-09 DIAGNOSIS — G89.29 CHRONIC PAIN OF RIGHT KNEE: ICD-10-CM

## 2023-11-09 RX ORDER — MELOXICAM 15 MG/1
15 TABLET ORAL DAILY
Qty: 30 TABLET | Refills: 0 | Status: SHIPPED | OUTPATIENT
Start: 2023-11-09

## 2023-11-09 NOTE — TELEPHONE ENCOUNTER
This medication refill is regarding a electronic request. Refill requested by  Judy Cano . Requested Prescriptions     Pending Prescriptions Disp Refills    meloxicam (MOBIC) 15 MG tablet [Pharmacy Med Name: Meloxicam 15 MG Oral Tablet] 30 tablet 0     Sig: Take 1 tablet by mouth once daily       Date of last visit: 10/18/2023   Date of next visit: 11/15/2023  Date of last refill: 10/11/23      Last Lipid Panel:    Lab Results   Component Value Date/Time    CHOL 163 09/23/2023 10:40 AM    TRIG 157 09/23/2023 10:40 AM    HDL 32 09/23/2023 10:40 AM    LDLCALC 100 09/23/2023 10:40 AM     Last CMP:   Lab Results   Component Value Date     10/18/2023    K 4.2 10/18/2023     10/18/2023    CO2 26 10/18/2023    BUN 14 10/18/2023    CREATININE 1.1 10/18/2023    GLUCOSE 174 (H) 10/18/2023    CALCIUM 9.3 09/23/2023    PROT 6.3 09/23/2023    LABALBU 4.2 09/23/2023    BILITOT 0.4 09/23/2023    ALKPHOS 98 09/23/2023    AST 31 09/23/2023    ALT 61 09/23/2023    LABGLOM >60 10/18/2023       Last Thyroid:    Lab Results   Component Value Date    TSH 1.050 08/29/2020     Last Hemoglobin A1C:    Lab Results   Component Value Date/Time    LABA1C 8.3 10/18/2023 04:50 PM    AVGG 189 10/18/2023 04:50 PM       Rx verified, ordered and set to EP.

## 2023-11-10 NOTE — TELEPHONE ENCOUNTER
I have not heard anything back from the pts insurance so I called [phone] and spoke to Hewitt and she stated that she sees the new information that was faxed to them but it hasn't been submitted to the clinical team. She started a new case for the PA and sent it for clinical review today. Will await response. Pt notified of the above.

## 2023-11-13 NOTE — TELEPHONE ENCOUNTER
PA approved for 2 pens per 28 days, valid until 11/10/24.     My Chart message sent to the pt to notify him of the PA approval.

## 2023-11-16 NOTE — TELEPHONE ENCOUNTER
He didn't tolerate metformin and he is on max dose glimepiride. He shouldn't take Actos due to his heart issues. See if his insurance would cover [place] or Emilia.   CG

## 2023-12-05 NOTE — TELEPHONE ENCOUNTER
My Chart message was never read so I contacted the pts domestic partner Mary Yang and notified her of CG response and recommendations. She will call his insurance to see if the suggested medications are covered and will notify the office of the response.

## 2023-12-06 RX ORDER — METFORMIN HYDROCHLORIDE 500 MG/1
500 TABLET, EXTENDED RELEASE ORAL
Qty: 90 TABLET | Refills: 1 | Status: SHIPPED | OUTPATIENT
Start: 2023-12-06

## 2023-12-06 NOTE — TELEPHONE ENCOUNTER
Rx sent to pharmacy as below:    Requested Prescriptions     Signed Prescriptions Disp Refills    metFORMIN (GLUCOPHAGE-XR) 500 MG extended release tablet 90 tablet 1     Sig: Take 1 tablet by mouth daily (with breakfast)     Authorizing Provider: Victoriano Thompson           Electronically signed by Victoriano Thompson MD on 12/6/2023 at 4:31 PM

## 2023-12-06 NOTE — TELEPHONE ENCOUNTER
He can start insulin (Lantus) for $35 per month. See if he's willing to do this. 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 Have him look into this if he's considering insulin.  CG

## 2023-12-06 NOTE — TELEPHONE ENCOUNTER
Venice Leblanc called back and stated that he would need a special waiver for work if he uses insulin and would prefer to try Metformin again. He had vomiting the last time he took it and contributed it to the medication but saw his GI specialist and was diagnosed with GERD and prescribed medication for that so would like to try the Metformin to see if he can tolerate it. Uses Med. Pt will check with the pharmacy tomorrow.

## 2023-12-06 NOTE — TELEPHONE ENCOUNTER
Spoke to Albin and she stated that she checked with the pts insurance regarding Jardiance and Natacha Papa and both medications will cost around $500.00 per month even using a savings card. She states he has a high deductible plan ($6,000 has to be met out of pocket before insurance will pay any Rx costs). Please advise if there are any other alternative medications that the pt can try that are cheaper or what is recommended.

## 2023-12-12 ENCOUNTER — OFFICE VISIT (OUTPATIENT)
Dept: FAMILY MEDICINE CLINIC | Age: 43
End: 2023-12-12
Payer: COMMERCIAL

## 2023-12-12 VITALS
BODY MASS INDEX: 41.86 KG/M2 | DIASTOLIC BLOOD PRESSURE: 76 MMHG | HEART RATE: 68 BPM | SYSTOLIC BLOOD PRESSURE: 132 MMHG | WEIGHT: 315 LBS | TEMPERATURE: 97.9 F | RESPIRATION RATE: 16 BRPM

## 2023-12-12 DIAGNOSIS — E11.65 TYPE 2 DIABETES MELLITUS WITH HYPERGLYCEMIA, WITHOUT LONG-TERM CURRENT USE OF INSULIN (HCC): Primary | ICD-10-CM

## 2023-12-12 DIAGNOSIS — E78.5 HYPERLIPIDEMIA, UNSPECIFIED HYPERLIPIDEMIA TYPE: ICD-10-CM

## 2023-12-12 PROCEDURE — 3074F SYST BP LT 130 MM HG: CPT | Performed by: FAMILY MEDICINE

## 2023-12-12 PROCEDURE — 3052F HG A1C>EQUAL 8.0%<EQUAL 9.0%: CPT | Performed by: FAMILY MEDICINE

## 2023-12-12 PROCEDURE — 3078F DIAST BP <80 MM HG: CPT | Performed by: FAMILY MEDICINE

## 2023-12-12 PROCEDURE — 99214 OFFICE O/P EST MOD 30 MIN: CPT | Performed by: FAMILY MEDICINE

## 2023-12-12 RX ORDER — ATORVASTATIN CALCIUM 40 MG/1
40 TABLET, FILM COATED ORAL DAILY
Qty: 30 TABLET | Refills: 11 | Status: SHIPPED | OUTPATIENT
Start: 2023-12-12

## 2023-12-12 RX ORDER — GLUCOSAMINE HCL/CHONDROITIN SU 500-400 MG
CAPSULE ORAL
Qty: 100 STRIP | Refills: 3 | Status: SHIPPED | OUTPATIENT
Start: 2023-12-12

## 2023-12-13 ASSESSMENT — ENCOUNTER SYMPTOMS
ABDOMINAL PAIN: 0
DIARRHEA: 0
VOMITING: 0
RESPIRATORY NEGATIVE: 1
NAUSEA: 0

## 2024-02-12 ENCOUNTER — OFFICE VISIT (OUTPATIENT)
Dept: FAMILY MEDICINE CLINIC | Age: 44
End: 2024-02-12
Payer: COMMERCIAL

## 2024-02-12 VITALS
SYSTOLIC BLOOD PRESSURE: 134 MMHG | DIASTOLIC BLOOD PRESSURE: 86 MMHG | HEART RATE: 60 BPM | TEMPERATURE: 97.6 F | BODY MASS INDEX: 41.6 KG/M2 | RESPIRATION RATE: 14 BRPM | WEIGHT: 315 LBS

## 2024-02-12 DIAGNOSIS — E78.5 HYPERLIPIDEMIA, UNSPECIFIED HYPERLIPIDEMIA TYPE: ICD-10-CM

## 2024-02-12 DIAGNOSIS — I10 ESSENTIAL HYPERTENSION: ICD-10-CM

## 2024-02-12 DIAGNOSIS — E11.65 TYPE 2 DIABETES MELLITUS WITH HYPERGLYCEMIA, WITHOUT LONG-TERM CURRENT USE OF INSULIN (HCC): Primary | ICD-10-CM

## 2024-02-12 LAB — HBA1C MFR BLD: 7.5 % (ref 4.3–5.7)

## 2024-02-12 PROCEDURE — 3079F DIAST BP 80-89 MM HG: CPT | Performed by: FAMILY MEDICINE

## 2024-02-12 PROCEDURE — 83036 HEMOGLOBIN GLYCOSYLATED A1C: CPT | Performed by: FAMILY MEDICINE

## 2024-02-12 PROCEDURE — 99214 OFFICE O/P EST MOD 30 MIN: CPT | Performed by: FAMILY MEDICINE

## 2024-02-12 PROCEDURE — 3075F SYST BP GE 130 - 139MM HG: CPT | Performed by: FAMILY MEDICINE

## 2024-02-12 PROCEDURE — 3051F HG A1C>EQUAL 7.0%<8.0%: CPT | Performed by: FAMILY MEDICINE

## 2024-02-12 RX ORDER — FAMOTIDINE 40 MG/1
40 TABLET, FILM COATED ORAL PRN
COMMUNITY

## 2024-02-12 RX ORDER — METOPROLOL SUCCINATE 100 MG/1
100 TABLET, EXTENDED RELEASE ORAL DAILY
Qty: 30 TABLET | Refills: 11 | Status: SHIPPED | OUTPATIENT
Start: 2024-02-12

## 2024-02-12 RX ORDER — ISOSORBIDE MONONITRATE 30 MG/1
30 TABLET, EXTENDED RELEASE ORAL DAILY
Qty: 30 TABLET | Refills: 11 | Status: SHIPPED | OUTPATIENT
Start: 2024-02-12

## 2024-02-12 RX ORDER — GLIMEPIRIDE 4 MG/1
4 TABLET ORAL 2 TIMES DAILY
Qty: 60 TABLET | Refills: 11 | Status: SHIPPED | OUTPATIENT
Start: 2024-02-12

## 2024-02-12 NOTE — PROGRESS NOTES
2024    Jun Andrews (:  1980) is a 43 y.o. male, Established patient, here for evaluation of the following chief complaint(s):  Follow-up (6 week follow up. No concerns at this time.)      ASSESSMENT/PLAN:    1. Type 2 diabetes mellitus with hyperglycemia, without long-term current use of insulin (HCC)  -     POCT glycosylated hemoglobin (Hb A1C)  -     glimepiride (AMARYL) 4 MG tablet; Take 1 tablet by mouth 2 times daily, Disp-60 tablet, R-11Normal  2. Essential hypertension  -     isosorbide mononitrate (IMDUR) 30 MG extended release tablet; Take 1 tablet by mouth daily, Disp-30 tablet, R-11Normal  -     metoprolol succinate (TOPROL XL) 100 MG extended release tablet; Take 1 tablet by mouth daily, Disp-30 tablet, R-11Normal  3. Hyperlipidemia, unspecified hyperlipidemia type    Continue metformin  mg daily and glimepiride 4 mg p.o. twice daily for diabetes.  His hemoglobin A1c is improving, but not quite yet at goal.  We reviewed goal hemoglobin A1c of less than 7% today.    Continue isosorbide 30 mg daily and Toprol- mg daily for hypertension.  His blood pressures are stable and well-controlled.    He is encouraged to continue with an ADA diet and increase physical activity to reduce his weight and blood sugars    Return in about 3 months (around 2024) for Follow up.  Hemoglobin A1c at the visit    SUBJECTIVE/OBJECTIVE:    HPI    Patient is here today for follow-up of diabetes.  He states that he has been eating better and has been more active.  His weight and blood sugars are down somewhat.  He is tolerating metformin  mg daily without diarrhea or GI symptoms.  He also continues on glimepiride 4 mg p.o. twice daily.  Fasting blood sugars are now in the 120s.  He denies any symptomatic hypoglycemia.  He continues on isosorbide 30 mg daily and Toprol- mg daily for hypertension.  His blood pressures have been stable and well-controlled.  He takes his prescribed

## 2024-03-21 NOTE — PROGRESS NOTES
[x] ResMed    [] Respironics/Dreamstation   Interface:   [x] Nasal    [] Nasal pillows   [] FFM      Provider:      [x] -SAMMY     []Ezio     [] Tamara    [] Farhana    [] Feliciano               [] [facility]      [] Adaptive    [] Hunter:      [] Other    Neck Size: 18.75  Mallampati 4  ESS:  5  SAQLI: 81    Here is a scan of the most recent download:        Presentation:   Jun presents for sleep medicine follow up for obstructive sleep apnea  Since the last visit, Jun reports that he is doing well. He reports no issues with his CPAP or sleeping at nighttime. He reports good benefit with machine. Patient is a . 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 His mother is with him today per his wife's request due to patient gasping. Patient's mother reports she was told by his wife that he gasps at nighttime \"once in awhile.\" Patient reports that his wife was telling him he gasps while awake. His mother reports that he does not gasp while awake but he sighs.     Weight lost 7 lbs over 1 year    Equipment issues:  The pressure is  acceptable, the mask is acceptable     Sleep issues:  Do you feel better? Yes  More rested?Yes   Better concentration? yes  Difficulty falling sleep?  No  Difficulty staying asleep?  No  Snoring?  No    Progress History:   Since last visit any new medical issues? No  Any new or changes in medicines? No  Any new sleep medicines? No    Past Medical hx   PMH:  Past Medical History:   Diagnosis Date    Hyperlipidemia     Hypertension     Sleep apnea     Type 2 diabetes mellitus without complication (HCC)      SURGICAL HISTORY:  Past Surgical History:   Procedure Laterality Date    ANKLE FRACTURE SURGERY Left Age 21    KNEE ARTHROSCOPY Right 2023     SOCIAL HISTORY:  Social History     Tobacco Use    Smoking status: Former     Current packs/day: 0.00     Types: Cigarettes     Quit date:      Years since quittin.2    Smokeless

## 2024-03-22 ENCOUNTER — OFFICE VISIT (OUTPATIENT)
Dept: PULMONOLOGY | Age: 44
End: 2024-03-22
Payer: COMMERCIAL

## 2024-03-22 VITALS
DIASTOLIC BLOOD PRESSURE: 78 MMHG | WEIGHT: 315 LBS | HEART RATE: 68 BPM | SYSTOLIC BLOOD PRESSURE: 112 MMHG | BODY MASS INDEX: 40.43 KG/M2 | TEMPERATURE: 97.7 F | HEIGHT: 74 IN | OXYGEN SATURATION: 95 %

## 2024-03-22 DIAGNOSIS — G47.33 OSA ON CPAP: Primary | ICD-10-CM

## 2024-03-22 DIAGNOSIS — E66.01 OBESITY, MORBID, BMI 40.0-49.9 (HCC): ICD-10-CM

## 2024-03-22 PROCEDURE — 3078F DIAST BP <80 MM HG: CPT

## 2024-03-22 PROCEDURE — 3074F SYST BP LT 130 MM HG: CPT

## 2024-03-22 PROCEDURE — 99214 OFFICE O/P EST MOD 30 MIN: CPT

## 2024-03-22 ASSESSMENT — ENCOUNTER SYMPTOMS
RHINORRHEA: 1
SORE THROAT: 0
COUGH: 0
WHEEZING: 0
SHORTNESS OF BREATH: 0

## 2024-05-02 ENCOUNTER — OFFICE VISIT (OUTPATIENT)
Dept: CARDIOLOGY CLINIC | Age: 44
End: 2024-05-02
Payer: COMMERCIAL

## 2024-05-02 VITALS
SYSTOLIC BLOOD PRESSURE: 152 MMHG | BODY MASS INDEX: 40.43 KG/M2 | HEART RATE: 80 BPM | DIASTOLIC BLOOD PRESSURE: 80 MMHG | HEIGHT: 74 IN | WEIGHT: 315 LBS

## 2024-05-02 DIAGNOSIS — I25.10 CORONARY ARTERY DISEASE INVOLVING NATIVE CORONARY ARTERY OF NATIVE HEART WITHOUT ANGINA PECTORIS: ICD-10-CM

## 2024-05-02 DIAGNOSIS — I10 PRIMARY HYPERTENSION: Primary | ICD-10-CM

## 2024-05-02 DIAGNOSIS — E78.01 FAMILIAL HYPERCHOLESTEROLEMIA: ICD-10-CM

## 2024-05-02 PROCEDURE — 99213 OFFICE O/P EST LOW 20 MIN: CPT | Performed by: NUCLEAR MEDICINE

## 2024-05-02 PROCEDURE — 3079F DIAST BP 80-89 MM HG: CPT | Performed by: NUCLEAR MEDICINE

## 2024-05-02 PROCEDURE — 3077F SYST BP >= 140 MM HG: CPT | Performed by: NUCLEAR MEDICINE

## 2024-05-02 NOTE — PROGRESS NOTES
[facility] [place] [facility]  [facility] CARDIOLOGY  [address]  [address]  [facility] [address]  Dept: [phone]  Dept Fax: [phone]  Loc: [phone]    Visit Date: 2024    Jun Andrews is a 44 y.o. male who presents todayfor:  Chief Complaint   Patient presents with    Check-Up    Hypertension    Hyperlipidemia    Coronary Artery Disease   Know mild CAD  No chest pain   No changes in breathing  No new symptoms  Bp is borderline   No dizziness  No syncope  On statins for hyperlipidemia        HPI:  HPI  Past Medical History:   Diagnosis Date    Hyperlipidemia     Hypertension     Sleep apnea     Type 2 diabetes mellitus without complication (HCC)       Past Surgical History:   Procedure Laterality Date    ANKLE FRACTURE SURGERY Left Age 21    KNEE ARTHROSCOPY Right 2023     Family History   Problem Relation Age of Onset    High Blood Pressure Father     Diabetes Father     Other Father         CHF    Stroke Father     Heart Disease Paternal Grandfather     Anxiety Disorder Mother     No Known Problems Brother      Social History     Tobacco Use    Smoking status: Former     Current packs/day: 0.00     Types: Cigarettes     Quit date:      Years since quittin.3    Smokeless tobacco: Never    Tobacco comments:     Quit smoking    Substance Use Topics    Alcohol use: Yes     Comment: socially      Current Outpatient Medications   Medication Sig Dispense Refill    glimepiride (AMARYL) 4 MG tablet Take 1 tablet by mouth 2 times daily 60 tablet 11    isosorbide mononitrate (IMDUR) 30 MG extended release tablet Take 1 tablet by mouth daily 30 tablet 11    metoprolol succinate (TOPROL XL) 100 MG extended release tablet Take 1 tablet by mouth daily 30 tablet 11    atorvastatin (LIPITOR) 40 MG tablet Take 1 tablet by mouth daily 30 tablet 11    blood glucose monitor strips Accuchek brand (or brand covered by insurance)  Test daily and prn. Dx: E11.9 100 strip 3

## 2024-07-03 ENCOUNTER — OFFICE VISIT (OUTPATIENT)
Dept: FAMILY MEDICINE CLINIC | Age: 44
End: 2024-07-03
Payer: COMMERCIAL

## 2024-07-03 VITALS
WEIGHT: 315 LBS | SYSTOLIC BLOOD PRESSURE: 128 MMHG | RESPIRATION RATE: 16 BRPM | HEART RATE: 64 BPM | TEMPERATURE: 98.1 F | BODY MASS INDEX: 40.83 KG/M2 | DIASTOLIC BLOOD PRESSURE: 78 MMHG

## 2024-07-03 DIAGNOSIS — I10 ESSENTIAL HYPERTENSION: ICD-10-CM

## 2024-07-03 DIAGNOSIS — E11.65 TYPE 2 DIABETES MELLITUS WITH HYPERGLYCEMIA, WITHOUT LONG-TERM CURRENT USE OF INSULIN (HCC): ICD-10-CM

## 2024-07-03 DIAGNOSIS — E66.01 MORBID OBESITY WITH BMI OF 40.0-44.9, ADULT (HCC): ICD-10-CM

## 2024-07-03 DIAGNOSIS — Z00.00 ANNUAL PHYSICAL EXAM: Primary | ICD-10-CM

## 2024-07-03 DIAGNOSIS — E78.5 HYPERLIPIDEMIA, UNSPECIFIED HYPERLIPIDEMIA TYPE: ICD-10-CM

## 2024-07-03 LAB — HBA1C MFR BLD: 8 %

## 2024-07-03 PROCEDURE — 99396 PREV VISIT EST AGE 40-64: CPT | Performed by: FAMILY MEDICINE

## 2024-07-03 PROCEDURE — 3078F DIAST BP <80 MM HG: CPT | Performed by: FAMILY MEDICINE

## 2024-07-03 PROCEDURE — 83036 HEMOGLOBIN GLYCOSYLATED A1C: CPT | Performed by: FAMILY MEDICINE

## 2024-07-03 PROCEDURE — 3074F SYST BP LT 130 MM HG: CPT | Performed by: FAMILY MEDICINE

## 2024-07-03 ASSESSMENT — ENCOUNTER SYMPTOMS
SHORTNESS OF BREATH: 0
ABDOMINAL PAIN: 0
CHEST TIGHTNESS: 0
EYES NEGATIVE: 1
BLOOD IN STOOL: 0

## 2024-07-03 NOTE — PROGRESS NOTES
on file   Tobacco Use    Smoking status: Former     Current packs/day: 0.00     Types: Cigarettes     Quit date: 2000     Years since quittin.5    Smokeless tobacco: Never    Tobacco comments:     Quit smoking    Vaping Use    Vaping Use: Never used   Substance and Sexual Activity    Alcohol use: Yes     Comment: socially    Drug use: Never    Sexual activity: Yes   Other Topics Concern    Not on file   Social History Narrative    Not on file     Social Determinants of Health     Financial Resource Strain: Low Risk  (2023)    Overall Financial Resource Strain (CARDIA)     Difficulty of Paying Living Expenses: Not hard at all   Food Insecurity: Not on file (2023)   Transportation Needs: Unknown (2023)    PRAPARE - Transportation     Lack of Transportation (Medical): Not on file     Lack of Transportation (Non-Medical): No   Physical Activity: Not on file   Stress: Not on file   Social Connections: Not on file   Intimate Partner Violence: Not on file   Housing Stability: Unknown (2023)    Housing Stability Vital Sign     Unable to Pay for Housing in the Last Year: Not on file     Number of Places Lived in the Last Year: Not on file     Unstable Housing in the Last Year: No        Family History   Problem Relation Age of Onset    High Blood Pressure Father     Diabetes Father     Other Father         CHF    Stroke Father     Heart Disease Paternal Grandfather     Anxiety Disorder Mother     No Known Problems Brother        ADVANCE DIRECTIVE: N, <no information>    There were no vitals filed for this visit.  Estimated body mass index is 41.93 kg/m² as calculated from the following:    Height as of 24: 1.88 m (6' 2\").    Weight as of 24: 148.1 kg (326 lb 9.6 oz).       Objective   Physical Exam  Vitals reviewed.   Constitutional:       General: He is not in acute distress.     Appearance: He is well-developed. He is obese.   HENT:      Head: Normocephalic and atraumatic.      Right

## 2024-09-30 DIAGNOSIS — E11.65 TYPE 2 DIABETES MELLITUS WITH HYPERGLYCEMIA, WITHOUT LONG-TERM CURRENT USE OF INSULIN (HCC): ICD-10-CM

## 2024-10-01 RX ORDER — METFORMIN HCL 500 MG
500 TABLET, EXTENDED RELEASE 24 HR ORAL
Qty: 90 TABLET | Refills: 0 | Status: SHIPPED | OUTPATIENT
Start: 2024-10-01

## 2024-10-01 NOTE — TELEPHONE ENCOUNTER
This medication refill is regarding a electronic request. Refill requested by patient.    Requested Prescriptions     Pending Prescriptions Disp Refills    metFORMIN (GLUCOPHAGE-XR) 500 MG extended release tablet [Pharmacy Med Name: metFORMIN HCl  MG Oral Tablet Extended Release 24 Hour] 90 tablet 0     Sig: Take 1 tablet by mouth once daily with breakfast     Date of last visit: 7/3/2024   Date of next visit: 11/5/2024  Date of last refill: 12/06/2023 #90/1  Pharmacy Name: [facility]     Last Lipid Panel:    Lab Results   Component Value Date/Time    CHOL 163 09/23/2023 10:40 AM    TRIG 157 09/23/2023 10:40 AM    HDL 32 09/23/2023 10:40 AM     Last CMP:   Lab Results   Component Value Date     10/18/2023    K 4.2 10/18/2023     10/18/2023    CO2 26 10/18/2023    BUN 14 10/18/2023    CREATININE 1.1 10/18/2023    GLUCOSE 174 (H) 10/18/2023    CALCIUM 9.3 09/23/2023    BILITOT 0.4 09/23/2023    ALKPHOS 98 09/23/2023    AST 31 09/23/2023    ALT 61 09/23/2023    LABGLOM >60 10/18/2023       Last Thyroid:    Lab Results   Component Value Date    TSH 1.050 08/29/2020     Last Hemoglobin A1C:    Lab Results   Component Value Date/Time    LABA1C 8.0 07/03/2024 03:32 PM       Rx verified, ordered and set to EP.

## 2024-11-02 ENCOUNTER — HOSPITAL ENCOUNTER (OUTPATIENT)
Age: 44
Discharge: HOME | End: 2024-11-02
Payer: COMMERCIAL

## 2024-11-02 DIAGNOSIS — Z00.00 ANNUAL PHYSICAL EXAM: ICD-10-CM

## 2024-11-02 DIAGNOSIS — E11.65 TYPE 2 DIABETES MELLITUS WITH HYPERGLYCEMIA, WITHOUT LONG-TERM CURRENT USE OF INSULIN (HCC): ICD-10-CM

## 2024-11-02 LAB
ALBUMIN SERPL BCG-MCNC: 4.1 G/DL (ref 3.5–5.1)
ALP SERPL-CCNC: 103 U/L (ref 38–126)
ALT SERPL W/O P-5'-P-CCNC: 53 U/L (ref 11–66)
ANION GAP SERPL CALC-SCNC: 10 MEQ/L (ref 8–16)
AST SERPL-CCNC: 33 U/L (ref 5–40)
BILIRUB SERPL-MCNC: 0.4 MG/DL (ref 0.3–1.2)
BUN SERPL-MCNC: 13 MG/DL (ref 7–22)
CALCIUM SERPL-MCNC: 9.2 MG/DL (ref 8.5–10.5)
CHLORIDE SERPL-SCNC: 101 MEQ/L (ref 98–111)
CHOLEST SERPL-MCNC: 133 MG/DL (ref 100–199)
CO2 SERPL-SCNC: 28 MEQ/L (ref 23–33)
CREAT SERPL-MCNC: 1.1 MG/DL (ref 0.4–1.2)
CREAT UR-MCNC: 247.4 MG/DL
DEPRECATED MEAN GLUCOSE BLD GHB EST-ACNC: 198 MG/DL (ref 70–126)
GFR SERPL CREATININE-BSD FRML MDRD: 86 ML/MIN/1.73M2
GLUCOSE SERPL-MCNC: 186 MG/DL (ref 70–108)
HBA1C MFR BLD HPLC: 8.6 % (ref 4.4–6.4)
HDLC SERPL-MCNC: 32 MG/DL
LDLC SERPL CALC-MCNC: 77 MG/DL
MICROALBUMIN UR-MCNC: < 1.2 MG/DL
MICROALBUMIN/CREAT RATIO PNL UR: 5 MG/G (ref 0–30)
POTASSIUM SERPL-SCNC: 4.4 MEQ/L (ref 3.5–5.2)
PROT SERPL-MCNC: 6.5 G/DL (ref 6.1–8)
SODIUM SERPL-SCNC: 139 MEQ/L (ref 135–145)
TRIGL SERPL-MCNC: 121 MG/DL (ref 0–199)

## 2024-11-02 PROCEDURE — 83036 HEMOGLOBIN GLYCOSYLATED A1C: CPT

## 2024-11-02 PROCEDURE — 80061 LIPID PANEL: CPT

## 2024-11-02 PROCEDURE — 82043 UR ALBUMIN QUANTITATIVE: CPT

## 2024-11-02 PROCEDURE — 80053 COMPREHEN METABOLIC PANEL: CPT

## 2024-11-02 PROCEDURE — 36415 COLL VENOUS BLD VENIPUNCTURE: CPT

## 2024-11-20 ENCOUNTER — OFFICE VISIT (OUTPATIENT)
Dept: FAMILY MEDICINE CLINIC | Age: 44
End: 2024-11-20

## 2024-11-20 VITALS
WEIGHT: 315 LBS | HEART RATE: 78 BPM | HEIGHT: 74 IN | BODY MASS INDEX: 40.43 KG/M2 | DIASTOLIC BLOOD PRESSURE: 82 MMHG | RESPIRATION RATE: 16 BRPM | SYSTOLIC BLOOD PRESSURE: 120 MMHG

## 2024-11-20 DIAGNOSIS — E78.5 HYPERLIPIDEMIA, UNSPECIFIED HYPERLIPIDEMIA TYPE: ICD-10-CM

## 2024-11-20 DIAGNOSIS — E11.65 TYPE 2 DIABETES MELLITUS WITH HYPERGLYCEMIA, WITHOUT LONG-TERM CURRENT USE OF INSULIN (HCC): Primary | ICD-10-CM

## 2024-11-20 DIAGNOSIS — E66.01 MORBID OBESITY WITH BMI OF 40.0-44.9, ADULT: ICD-10-CM

## 2024-11-20 RX ORDER — METFORMIN HYDROCHLORIDE 500 MG/1
500 TABLET, EXTENDED RELEASE ORAL 2 TIMES DAILY
Qty: 180 TABLET | Refills: 1 | Status: SHIPPED | OUTPATIENT
Start: 2024-11-20

## 2024-11-20 ASSESSMENT — ENCOUNTER SYMPTOMS
SHORTNESS OF BREATH: 0
ABDOMINAL PAIN: 0
EYES NEGATIVE: 1
BLOOD IN STOOL: 0
CHEST TIGHTNESS: 0

## 2024-11-20 NOTE — PROGRESS NOTES
Diabetic retinal exam  Never done    DTaP/Tdap/Td vaccine (1 - Tdap) Never done    COVID-19 Vaccine (1 - 2023-24 season) Never done    Hepatitis B vaccine (1 of 3 - 19+ 3-dose series) 12/12/2024 (Originally 3/23/2001)    Pneumococcal 0-64 years Vaccine (1 of 2 - PCV) 12/12/2024 (Originally 3/23/1988)    Flu vaccine (1) 11/20/2025 (Originally 8/1/2024)    Depression Screen  02/12/2025    A1C test (Diabetic or Prediabetic)  11/02/2025    Diabetic Alb to Cr ratio (uACR) test  11/02/2025    Lipids  11/02/2025    GFR test (Diabetes, CKD 3-4, OR last GFR 15-59)  11/02/2025    Diabetic foot exam  11/20/2025    Hepatitis A vaccine  Aged Out    Hib vaccine  Aged Out    HPV vaccine  Aged Out    Polio vaccine  Aged Out    Meningococcal (ACWY) vaccine  Aged Out    Varicella vaccine  Discontinued    Hepatitis C screen  Discontinued           An electronic signature was used to authenticate this note.              Electronically signed by Liana Hoffmann MD on 11/20/2024 at 8:44 AM

## 2025-01-03 DIAGNOSIS — E78.5 HYPERLIPIDEMIA, UNSPECIFIED HYPERLIPIDEMIA TYPE: ICD-10-CM

## 2025-01-03 NOTE — PROGRESS NOTES
[facility]  [employer] .  [address] [address]  Dept: [phone]  Dept Fax: [phone]  Loc: [phone]    Visit Date: 2023    Franki Hagan is a 41 y.o. male who presents todayfor:  Chief Complaint   Patient presents with    1 Year Follow Up    Chest Pain     Once in a blue moon will get chest tightness     Shortness of Breath    Coronary Artery Disease   Known mild CAD  Some chest tightness at times  Not every day   No changes in breathing  Some baseline dyspnea  On CPAP   BP is stable  No dizziness  No syncope        HPI:  HPI  Past Medical History:   Diagnosis Date    Hyperlipidemia     Hypertension     Type 2 diabetes mellitus without complication ([address]       Past Surgical History:   Procedure Laterality Date    ANKLE FRACTURE SURGERY Left Age 25     Family History   Problem Relation Age of Onset    High Blood Pressure Father     Diabetes Father     Other Father         CHF    Stroke Father     Heart Disease Paternal Grandfather     Anxiety Disorder Mother     No Known Problems Brother      Social History     Tobacco Use    Smoking status: Former     Types: Cigarettes     Quit date:      Years since quittin.3    Smokeless tobacco: Never    Tobacco comments:     Quit smoking    Substance Use Topics    Alcohol use: Yes     Comment: socially      Current Outpatient Medications   Medication Sig Dispense Refill    metoprolol succinate (TOPROL XL) 100 MG extended release tablet Take 1 tablet by mouth daily 30 tablet 11    glimepiride (AMARYL) 4 MG tablet Take 1 tablet by mouth 2 times daily 60 tablet 11    acetaminophen (TYLENOL) 325 MG tablet Take 2 tablets by mouth every 6 hours as needed for Pain      ibuprofen (ADVIL;MOTRIN) 600 MG tablet Take 1 tablet by mouth 3 times daily as needed for Pain 30 tablet 0    isosorbide mononitrate (IMDUR) 30 MG extended release tablet Take 1 tablet by mouth once daily 90 tablet 1 right/yes

## 2025-01-06 RX ORDER — ATORVASTATIN CALCIUM 40 MG/1
40 TABLET, FILM COATED ORAL DAILY
Qty: 90 TABLET | Refills: 3 | Status: SHIPPED | OUTPATIENT
Start: 2025-01-06

## 2025-01-06 NOTE — TELEPHONE ENCOUNTER
This medication refill is regarding a electronic request. Refill requested by  Pharmacy .    Requested Prescriptions     Pending Prescriptions Disp Refills    atorvastatin (LIPITOR) 40 MG tablet [Pharmacy Med Name: Atorvastatin Calcium 40 MG Oral Tablet] 30 tablet 0     Sig: Take 1 tablet by mouth once daily     Date of last visit: 11/20/2024   Date of next visit: 2/20/2025  Date of last refill: 12/12/2023 #30/11  Pharmacy Name: [facility]     Last Lipid Panel:    Lab Results   Component Value Date/Time    CHOL 133 11/02/2024 08:40 AM    TRIG 121 11/02/2024 08:40 AM    HDL 32 11/02/2024 08:40 AM     Last CMP:   Lab Results   Component Value Date     11/02/2024    K 4.4 11/02/2024     11/02/2024    CO2 28 11/02/2024    BUN 13 11/02/2024    CREATININE 1.1 11/02/2024    GLUCOSE 186 (H) 11/02/2024    CALCIUM 9.2 11/02/2024    BILITOT 0.4 11/02/2024    ALKPHOS 103 11/02/2024    AST 33 11/02/2024    ALT 53 11/02/2024    LABGLOM 86 11/02/2024       Last Thyroid:    Lab Results   Component Value Date    TSH 1.050 08/29/2020     Last Hemoglobin A1C:    Lab Results   Component Value Date/Time    LABA1C 8.6 11/02/2024 08:40 AM       Rx verified, ordered and set to EP.

## 2025-03-15 DIAGNOSIS — I10 ESSENTIAL HYPERTENSION: ICD-10-CM

## 2025-03-17 RX ORDER — METOPROLOL SUCCINATE 100 MG/1
100 TABLET, EXTENDED RELEASE ORAL DAILY
Qty: 30 TABLET | Refills: 11 | Status: SHIPPED | OUTPATIENT
Start: 2025-03-17

## 2025-03-17 NOTE — TELEPHONE ENCOUNTER
Request sent from [facility] pharmacy for refill of metoprolol 100 mg qd.  Last seen 11/20/24, next appt 3/27/25.  Med verified.  Order pended.

## 2025-03-25 DIAGNOSIS — E11.65 TYPE 2 DIABETES MELLITUS WITH HYPERGLYCEMIA, WITHOUT LONG-TERM CURRENT USE OF INSULIN (HCC): ICD-10-CM

## 2025-03-25 DIAGNOSIS — I10 ESSENTIAL HYPERTENSION: ICD-10-CM

## 2025-03-26 RX ORDER — ISOSORBIDE MONONITRATE 30 MG/1
30 TABLET, EXTENDED RELEASE ORAL DAILY
Qty: 30 TABLET | Refills: 0 | Status: SHIPPED | OUTPATIENT
Start: 2025-03-26

## 2025-03-26 RX ORDER — GLIMEPIRIDE 4 MG/1
4 TABLET ORAL 2 TIMES DAILY
Qty: 60 TABLET | Refills: 0 | Status: SHIPPED | OUTPATIENT
Start: 2025-03-26

## 2025-03-26 SDOH — ECONOMIC STABILITY: FOOD INSECURITY: WITHIN THE PAST 12 MONTHS, THE FOOD YOU BOUGHT JUST DIDN'T LAST AND YOU DIDN'T HAVE MONEY TO GET MORE.: NEVER TRUE

## 2025-03-26 SDOH — ECONOMIC STABILITY: FOOD INSECURITY: WITHIN THE PAST 12 MONTHS, YOU WORRIED THAT YOUR FOOD WOULD RUN OUT BEFORE YOU GOT MONEY TO BUY MORE.: NEVER TRUE

## 2025-03-26 ASSESSMENT — PATIENT HEALTH QUESTIONNAIRE - PHQ9
SUM OF ALL RESPONSES TO PHQ QUESTIONS 1-9: 0
1. LITTLE INTEREST OR PLEASURE IN DOING THINGS: NOT AT ALL
SUM OF ALL RESPONSES TO PHQ QUESTIONS 1-9: 0
2. FEELING DOWN, DEPRESSED OR HOPELESS: NOT AT ALL

## 2025-03-26 NOTE — PROGRESS NOTES
Health Maintenance Due   Topic Date Due    HIV screen  Never done    Diabetic retinal exam  Never done    Hepatitis B vaccine (1 of 3 - 19+ 3-dose series) Never done    DTaP/Tdap/Td vaccine (1 - Tdap) Never done    Pneumococcal 0-49 years Vaccine (1 of 2 - PCV) Never done    COVID-19 Vaccine (1 - 2024-25 season) Never done    Depression Screen  02/12/2025

## 2025-03-26 NOTE — TELEPHONE ENCOUNTER
This medication refill is regarding a electronic request. Refill requested by patient.    Requested Prescriptions     Pending Prescriptions Disp Refills    glimepiride (AMARYL) 4 MG tablet [Pharmacy Med Name: Glimepiride 4 MG Oral Tablet] 60 tablet 0     Sig: Take 1 tablet by mouth twice daily    isosorbide mononitrate (IMDUR) 30 MG extended release tablet [Pharmacy Med Name: Isosorbide Mononitrate ER 30 MG Oral Tablet Extended Release 24 Hour] 30 tablet 0     Sig: Take 1 tablet by mouth once daily     Date of last visit: 11/20/2024   Date of next visit: 3/27/2025  Date of last refill: 2/12/24#60/11  Pharmacy Name: [address] - P [phone] - F [phone]     Last Lipid Panel:    Lab Results   Component Value Date/Time    CHOL 133 11/02/2024 08:40 AM    TRIG 121 11/02/2024 08:40 AM    HDL 32 11/02/2024 08:40 AM     Last CMP:   Lab Results   Component Value Date     11/02/2024    K 4.4 11/02/2024     11/02/2024    CO2 28 11/02/2024    BUN 13 11/02/2024    CREATININE 1.1 11/02/2024    GLUCOSE 186 (H) 11/02/2024    CALCIUM 9.2 11/02/2024    BILITOT 0.4 11/02/2024    ALKPHOS 103 11/02/2024    AST 33 11/02/2024    ALT 53 11/02/2024    LABGLOM 86 11/02/2024       Last Thyroid:    Lab Results   Component Value Date    TSH 1.050 08/29/2020     Last Hemoglobin A1C:    Lab Results   Component Value Date/Time    LABA1C 8.6 11/02/2024 08:40 AM       Rx verified, ordered and set to EP.

## 2025-03-27 ENCOUNTER — OFFICE VISIT (OUTPATIENT)
Dept: FAMILY MEDICINE CLINIC | Age: 45
End: 2025-03-27
Payer: COMMERCIAL

## 2025-03-27 VITALS
HEART RATE: 60 BPM | HEIGHT: 74 IN | BODY MASS INDEX: 40.37 KG/M2 | SYSTOLIC BLOOD PRESSURE: 124 MMHG | DIASTOLIC BLOOD PRESSURE: 70 MMHG | RESPIRATION RATE: 18 BRPM | TEMPERATURE: 97.4 F | WEIGHT: 314.6 LBS

## 2025-03-27 DIAGNOSIS — I10 ESSENTIAL HYPERTENSION: ICD-10-CM

## 2025-03-27 DIAGNOSIS — E11.65 TYPE 2 DIABETES MELLITUS WITH HYPERGLYCEMIA, WITHOUT LONG-TERM CURRENT USE OF INSULIN (HCC): Primary | ICD-10-CM

## 2025-03-27 DIAGNOSIS — E66.01 CLASS 3 SEVERE OBESITY DUE TO EXCESS CALORIES WITH SERIOUS COMORBIDITY AND BODY MASS INDEX (BMI) OF 40.0 TO 44.9 IN ADULT: ICD-10-CM

## 2025-03-27 DIAGNOSIS — E66.813 CLASS 3 SEVERE OBESITY DUE TO EXCESS CALORIES WITH SERIOUS COMORBIDITY AND BODY MASS INDEX (BMI) OF 40.0 TO 44.9 IN ADULT: ICD-10-CM

## 2025-03-27 LAB — HBA1C MFR BLD: 7.7 % (ref 4.3–5.7)

## 2025-03-27 PROCEDURE — 3074F SYST BP LT 130 MM HG: CPT | Performed by: FAMILY MEDICINE

## 2025-03-27 PROCEDURE — 99214 OFFICE O/P EST MOD 30 MIN: CPT | Performed by: FAMILY MEDICINE

## 2025-03-27 PROCEDURE — 3078F DIAST BP <80 MM HG: CPT | Performed by: FAMILY MEDICINE

## 2025-03-27 PROCEDURE — 3051F HG A1C>EQUAL 7.0%<8.0%: CPT | Performed by: FAMILY MEDICINE

## 2025-03-27 PROCEDURE — G2211 COMPLEX E/M VISIT ADD ON: HCPCS | Performed by: FAMILY MEDICINE

## 2025-03-27 PROCEDURE — 83036 HEMOGLOBIN GLYCOSYLATED A1C: CPT | Performed by: FAMILY MEDICINE

## 2025-03-27 RX ORDER — METFORMIN HYDROCHLORIDE 500 MG/1
TABLET, EXTENDED RELEASE ORAL
Qty: 270 TABLET | Refills: 3 | Status: SHIPPED | OUTPATIENT
Start: 2025-03-27

## 2025-03-27 RX ORDER — FAMOTIDINE 40 MG/1
40 TABLET, FILM COATED ORAL NIGHTLY
COMMUNITY
Start: 2025-02-27

## 2025-03-27 ASSESSMENT — ENCOUNTER SYMPTOMS
EYES NEGATIVE: 1
SHORTNESS OF BREATH: 0
ABDOMINAL PAIN: 0
CHEST TIGHTNESS: 0
BLOOD IN STOOL: 0

## 2025-03-27 NOTE — PROGRESS NOTES
3/27/2025      Jun Andrews (:  1980) is a 45 y.o. male,Established patient, here for evaluation of the following chief complaint(s):  Follow-up (3 mo f/u for diabetes )        ASSESSMENT/PLAN     1. Type 2 diabetes mellitus with hyperglycemia, without long-term current use of insulin (HCC)  -     POCT glycosylated hemoglobin (Hb A1C)  -     metFORMIN (GLUCOPHAGE-XR) 500 MG extended release tablet; Take 2 po qam and 1 po qpm, Disp-270 tablet, R-3Normal  2. Essential hypertension  3. Class 3 severe obesity due to excess calories with serious comorbidity and body mass index (BMI) of 40.0 to 44.9 in adult     Increase Metformin XR 500mg to 2 po qam and 1 po qpm for better control of diabetes. Continue glimepiride at current dose.  His diabetes is a chronic and uncontrolled, but improving, condition    Continue Imdur 30mg daily and Toprol XL 100mg daily for HTN.  This condition is chronic and well controlled.    He will continue to work on diet and exercise to help reduce his weight.  His obesity is a chronic and improving condition.       Return in about 3 months (around 2025) for Follow up.  HbA1C at the visit.    SUBJECTIVE     Jun Andrews is a 45 y.o.male      Here for follow up of chronic health problems including:    Patient Active Problem List   Diagnosis    Morbid obesity with BMI of 40.0-44.9, adult    Dilated cardiomyopathy (HCC)    Type 2 diabetes mellitus without complication, without long-term current use of insulin (HCC)    VAMSHI on CPAP    Essential hypertension    Hyperlipidemia         History of Present Illness  The patient presents for evaluation of diabetes mellitus, blood pressure management, and obesity    Satisfactory blood glucose levels are reported, with readings typically ranging from 120 to 170, although there have been 1 or 2 instances of hyperglycemia exceeding 200. No hypoglycemic episodes characterized by shakiness or sweating are reported. Medication adherence

## 2025-04-08 DIAGNOSIS — I10 ESSENTIAL HYPERTENSION: ICD-10-CM

## 2025-04-08 DIAGNOSIS — E11.65 TYPE 2 DIABETES MELLITUS WITH HYPERGLYCEMIA, WITHOUT LONG-TERM CURRENT USE OF INSULIN (HCC): ICD-10-CM

## 2025-04-08 RX ORDER — GLIMEPIRIDE 4 MG/1
4 TABLET ORAL 2 TIMES DAILY
Qty: 60 TABLET | Refills: 11 | Status: SHIPPED | OUTPATIENT
Start: 2025-04-08

## 2025-04-08 RX ORDER — ISOSORBIDE MONONITRATE 30 MG/1
30 TABLET, EXTENDED RELEASE ORAL DAILY
Qty: 30 TABLET | Refills: 11 | Status: SHIPPED | OUTPATIENT
Start: 2025-04-08

## 2025-04-08 NOTE — TELEPHONE ENCOUNTER
Jun Andrews called requesting a refill on the following medications:  Requested Prescriptions     Pending Prescriptions Disp Refills    glimepiride (AMARYL) 4 MG tablet [Pharmacy Med Name: Glimepiride 4 MG Oral Tablet] 60 tablet 0     Sig: Take 1 tablet by mouth twice daily    isosorbide mononitrate (IMDUR) 30 MG extended release tablet [Pharmacy Med Name: Isosorbide Mononitrate ER 30 MG Oral Tablet Extended Release 24 Hour] 30 tablet 0     Sig: Take 1 tablet by mouth once daily       Date of last visit: 3/27/2025  Date of next visit (if applicable):6/12/2025  Date of last refill: glimepiride 03/26/2025     isosorbide hconymzrrrw14/26/2025      Pharmacy Name: [facility] [address] [place] [address], [place] [address]       Thanks,  Sarahi Justice MA

## 2025-04-22 ENCOUNTER — OFFICE VISIT (OUTPATIENT)
Dept: FAMILY MEDICINE CLINIC | Age: 45
End: 2025-04-22
Payer: COMMERCIAL

## 2025-04-22 ENCOUNTER — HOSPITAL ENCOUNTER (OUTPATIENT)
Dept: GENERAL RADIOLOGY | Age: 45
Discharge: HOME | End: 2025-04-22
Payer: COMMERCIAL

## 2025-04-22 ENCOUNTER — HOSPITAL ENCOUNTER (OUTPATIENT)
Age: 45
Discharge: HOME | End: 2025-04-22
Payer: COMMERCIAL

## 2025-04-22 VITALS
RESPIRATION RATE: 16 BRPM | TEMPERATURE: 98 F | BODY MASS INDEX: 41.24 KG/M2 | HEART RATE: 88 BPM | SYSTOLIC BLOOD PRESSURE: 132 MMHG | WEIGHT: 315 LBS | DIASTOLIC BLOOD PRESSURE: 86 MMHG | OXYGEN SATURATION: 95 %

## 2025-04-22 DIAGNOSIS — R05.8 PAROXYSMAL COUGH: Primary | ICD-10-CM

## 2025-04-22 DIAGNOSIS — R05.8 PAROXYSMAL COUGH: ICD-10-CM

## 2025-04-22 PROCEDURE — 71046 X-RAY EXAM CHEST 2 VIEWS: CPT

## 2025-04-22 PROCEDURE — 99213 OFFICE O/P EST LOW 20 MIN: CPT | Performed by: FAMILY MEDICINE

## 2025-04-22 PROCEDURE — 3079F DIAST BP 80-89 MM HG: CPT | Performed by: FAMILY MEDICINE

## 2025-04-22 PROCEDURE — 3075F SYST BP GE 130 - 139MM HG: CPT | Performed by: FAMILY MEDICINE

## 2025-04-22 ASSESSMENT — ENCOUNTER SYMPTOMS
SORE THROAT: 0
COUGH: 1
ABDOMINAL PAIN: 0
WHEEZING: 0

## 2025-04-22 NOTE — PROGRESS NOTES
[facility]  [facility]  [address] [facility] [address]  Dept: [phone]  Loc: [phone]    4/22/2025    Chief Complaint   Patient presents with    Cough     Intermittent cough and coughing spells. Present for the past 3-4 weeks. Accompanied with SOB. Usually starts with exertion or when drinking something cold.          SUBJECTIVE     Jun Andrews is a 45 y.o.male      History of Present Illness  The patient presents for evaluation of a cough.    Intermittent coughing fits are reported. These episodes are often triggered by the consumption of cold beverages or physical exertion. The onset of these symptoms was approximately one week following his last visit, with each episode lasting around 30 seconds. Shortness of breath is experienced during these coughing fits. No difficulty swallowing, recent illness, or symptoms such as fever, chills, sweats, cold symptoms, or head congestion are reported. Occasionally, mucus is produced during these coughing episodes, but there is no wheezing. Lawn mowing for several hours has not exacerbated his symptoms. No ankle swelling is reported.    Blood sugars have been good, and the increased dose of metformin is well tolerated.    Review of Systems   Constitutional:  Negative for chills, diaphoresis and fever.   HENT:  Negative for postnasal drip and sore throat.    Respiratory:  Positive for cough. Negative for wheezing.    Cardiovascular:  Negative for chest pain.   Gastrointestinal:  Negative for abdominal pain.   All other systems reviewed and are negative.        OBJECTIVE     /86   Pulse 88   Temp 98 °F (36.7 °C) (Oral)   Resp 16   Wt (!) 145.7 kg (321 lb 3.2 oz)   SpO2 95%   BMI 41.24 kg/m²     Physical Exam  Vitals reviewed.   Constitutional:       General: He is not in acute distress.     Appearance: He is well-developed. He is obese.   HENT:      Head: Normocephalic and atraumatic.      Right Ear:

## 2025-04-23 ENCOUNTER — RESULTS FOLLOW-UP (OUTPATIENT)
Dept: FAMILY MEDICINE CLINIC | Age: 45
End: 2025-04-23

## 2025-04-23 ENCOUNTER — TELEPHONE (OUTPATIENT)
Dept: FAMILY MEDICINE CLINIC | Age: 45
End: 2025-04-23

## 2025-04-23 NOTE — TELEPHONE ENCOUNTER
Please notify him that his chest xray is normal.  I would encourage him to chew food well, slow down when eating.  If coughing spells persist, would recommend PFTs next. CG

## 2025-04-23 NOTE — TELEPHONE ENCOUNTER
Called and xray with patient. Patient expressed understanding. He states his coughing spells are a hit or miss. He will call us in a week or 2 and let us know how the cough is

## 2025-05-09 ENCOUNTER — OFFICE VISIT (OUTPATIENT)
Dept: CARDIOLOGY CLINIC | Age: 45
End: 2025-05-09
Payer: COMMERCIAL

## 2025-05-09 VITALS
HEIGHT: 74 IN | DIASTOLIC BLOOD PRESSURE: 78 MMHG | WEIGHT: 315 LBS | BODY MASS INDEX: 40.43 KG/M2 | SYSTOLIC BLOOD PRESSURE: 144 MMHG | HEART RATE: 70 BPM

## 2025-05-09 DIAGNOSIS — E78.01 FAMILIAL HYPERCHOLESTEROLEMIA: ICD-10-CM

## 2025-05-09 DIAGNOSIS — I49.3 PVC (PREMATURE VENTRICULAR CONTRACTION): ICD-10-CM

## 2025-05-09 DIAGNOSIS — I10 PRIMARY HYPERTENSION: ICD-10-CM

## 2025-05-09 DIAGNOSIS — I25.10 CORONARY ARTERY DISEASE INVOLVING NATIVE CORONARY ARTERY OF NATIVE HEART WITHOUT ANGINA PECTORIS: Primary | ICD-10-CM

## 2025-05-09 PROCEDURE — 3077F SYST BP >= 140 MM HG: CPT | Performed by: NUCLEAR MEDICINE

## 2025-05-09 PROCEDURE — 93000 ELECTROCARDIOGRAM COMPLETE: CPT | Performed by: NUCLEAR MEDICINE

## 2025-05-09 PROCEDURE — 99214 OFFICE O/P EST MOD 30 MIN: CPT | Performed by: NUCLEAR MEDICINE

## 2025-05-09 PROCEDURE — 3078F DIAST BP <80 MM HG: CPT | Performed by: NUCLEAR MEDICINE

## 2025-05-09 NOTE — PROGRESS NOTES
[facility] [place] [facility]  [facility] CARDIOLOGY  [address]  [address]  [facility] [address]  Dept: [phone]  Dept Fax: [phone]  Loc: [phone]    Visit Date: 2025    Jun Andrews is a 45 y.o. male who presents todayfor:  Chief Complaint   Patient presents with    1 Year Follow Up    Hypertension    Coronary Artery Disease    Hyperlipidemia   Cath before in    Mild CAD then   No chest pain  Some baseline dyspnea  BP is stable  No dizziness  No syncope  Dm is fair to high at times  Last hgb A 1 c was not the greatest   Weight is an issue  Some weight loss  On statins for hyperlipidemia  No issues with the meds        HPI:  HPI  Past Medical History:   Diagnosis Date    Hyperlipidemia     Hypertension     Sleep apnea     Type 2 diabetes mellitus without complication (HCC)       Past Surgical History:   Procedure Laterality Date    ANKLE FRACTURE SURGERY Left Age 21    KNEE ARTHROSCOPY Right 2023     Family History   Problem Relation Age of Onset    High Blood Pressure Father     Diabetes Father     Other Father         CHF    Stroke Father     Heart Disease Paternal Grandfather     Anxiety Disorder Mother     No Known Problems Brother      Social History     Tobacco Use    Smoking status: Former     Current packs/day: 0.00     Types: Cigarettes     Quit date:      Years since quittin.3    Smokeless tobacco: Never    Tobacco comments:     Quit smoking    Substance Use Topics    Alcohol use: Yes     Comment: socially      Current Outpatient Medications   Medication Sig Dispense Refill    glimepiride (AMARYL) 4 MG tablet Take 1 tablet by mouth twice daily 60 tablet 11    isosorbide mononitrate (IMDUR) 30 MG extended release tablet Take 1 tablet by mouth once daily 30 tablet 11    famotidine (PEPCID) 40 MG tablet Take 1 tablet by mouth nightly at bedtime.      metFORMIN (GLUCOPHAGE-XR) 500 MG extended release tablet Take 2 po qam and 1 po qpm 270 tablet 3

## 2025-06-12 ENCOUNTER — OFFICE VISIT (OUTPATIENT)
Age: 45
End: 2025-06-12
Payer: COMMERCIAL

## 2025-06-12 VITALS
BODY MASS INDEX: 40.43 KG/M2 | SYSTOLIC BLOOD PRESSURE: 152 MMHG | HEIGHT: 74 IN | WEIGHT: 315 LBS | TEMPERATURE: 98.7 F | DIASTOLIC BLOOD PRESSURE: 64 MMHG | OXYGEN SATURATION: 98 % | HEART RATE: 76 BPM

## 2025-06-12 DIAGNOSIS — R05.3 CHRONIC COUGH: ICD-10-CM

## 2025-06-12 DIAGNOSIS — E66.813 CLASS 3 SEVERE OBESITY DUE TO EXCESS CALORIES WITH SERIOUS COMORBIDITY AND BODY MASS INDEX (BMI) OF 40.0 TO 44.9 IN ADULT (HCC): ICD-10-CM

## 2025-06-12 DIAGNOSIS — G47.33 OSA ON CPAP: Primary | ICD-10-CM

## 2025-06-12 PROCEDURE — 3077F SYST BP >= 140 MM HG: CPT

## 2025-06-12 PROCEDURE — 99214 OFFICE O/P EST MOD 30 MIN: CPT

## 2025-06-12 PROCEDURE — 3078F DIAST BP <80 MM HG: CPT

## 2025-06-12 NOTE — PROGRESS NOTES
normal. No respiratory distress.      Breath sounds: No wheezing, rhonchi or rales.   Musculoskeletal:      Cervical back: Neck supple.      Right lower leg: No edema.      Left lower leg: No edema.   Skin:     General: Skin is warm and dry.   Neurological:      General: No focal deficit present.      Mental Status: He is alert.   Psychiatric:         Mood and Affect: Mood normal.         Behavior: Behavior normal.         Thought Content: Thought content normal.            Information added by my medical assistant/LPN was reviewed today.    Electronically signed by SRIDEVI Bernal CNP on 6/12/2025 at 3:00 PM

## 2025-06-17 ENCOUNTER — OFFICE VISIT (OUTPATIENT)
Dept: FAMILY MEDICINE CLINIC | Age: 45
End: 2025-06-17
Payer: COMMERCIAL

## 2025-06-17 VITALS
WEIGHT: 315 LBS | DIASTOLIC BLOOD PRESSURE: 88 MMHG | RESPIRATION RATE: 16 BRPM | TEMPERATURE: 97.3 F | SYSTOLIC BLOOD PRESSURE: 128 MMHG | HEART RATE: 76 BPM | BODY MASS INDEX: 40.98 KG/M2

## 2025-06-17 DIAGNOSIS — E66.813 CLASS 3 SEVERE OBESITY DUE TO EXCESS CALORIES WITH SERIOUS COMORBIDITY AND BODY MASS INDEX (BMI) OF 40.0 TO 44.9 IN ADULT (HCC): ICD-10-CM

## 2025-06-17 DIAGNOSIS — E78.5 HYPERLIPIDEMIA, UNSPECIFIED HYPERLIPIDEMIA TYPE: ICD-10-CM

## 2025-06-17 DIAGNOSIS — I10 ESSENTIAL HYPERTENSION: ICD-10-CM

## 2025-06-17 DIAGNOSIS — E11.65 TYPE 2 DIABETES MELLITUS WITH HYPERGLYCEMIA, WITHOUT LONG-TERM CURRENT USE OF INSULIN (HCC): Primary | ICD-10-CM

## 2025-06-17 PROCEDURE — 99214 OFFICE O/P EST MOD 30 MIN: CPT | Performed by: FAMILY MEDICINE

## 2025-06-17 PROCEDURE — 3051F HG A1C>EQUAL 7.0%<8.0%: CPT | Performed by: FAMILY MEDICINE

## 2025-06-17 PROCEDURE — 3079F DIAST BP 80-89 MM HG: CPT | Performed by: FAMILY MEDICINE

## 2025-06-17 PROCEDURE — 3074F SYST BP LT 130 MM HG: CPT | Performed by: FAMILY MEDICINE

## 2025-06-17 ASSESSMENT — ENCOUNTER SYMPTOMS
BLOOD IN STOOL: 0
SHORTNESS OF BREATH: 0
CHEST TIGHTNESS: 0
ABDOMINAL PAIN: 0
EYES NEGATIVE: 1

## 2025-06-18 NOTE — PROGRESS NOTES
Health Maintenance Due   Topic Date Due    HIV screen  Never done    Diabetic retinal exam  Never done    Hepatitis B vaccine (1 of 3 - 19+ 3-dose series) Never done    DTaP/Tdap/Td vaccine (1 - Tdap) Never done    Pneumococcal 0-49 years Vaccine (1 of 2 - PCV) Never done      
144.8 kg (319 lb 3.2 oz)   06/12/25 (!) 146 kg (321 lb 12.8 oz)   05/09/25 (!) 145.2 kg (320 lb)       Physical Exam  Vitals reviewed.   Constitutional:       General: He is not in acute distress.     Appearance: He is well-developed. He is obese.   HENT:      Head: Normocephalic and atraumatic.      Right Ear: Tympanic membrane normal.      Left Ear: Tympanic membrane normal.      Mouth/Throat:      Mouth: Mucous membranes are moist.      Pharynx: No posterior oropharyngeal erythema.   Eyes:      Conjunctiva/sclera: Conjunctivae normal.   Cardiovascular:      Rate and Rhythm: Normal rate and regular rhythm.      Heart sounds: No murmur heard.  Pulmonary:      Breath sounds: Normal breath sounds. No wheezing.   Musculoskeletal:      Right lower leg: No edema.      Left lower leg: No edema.   Lymphadenopathy:      Cervical: No cervical adenopathy.   Neurological:      Mental Status: He is alert.       Hemoglobin A1C POC   Date Value Ref Range Status   03/27/2025 7.7 (H) 4.3 - 5.7 % Final     Comment:     Performed at [facility] Office under CLIA # [id number]     Lab Results   Component Value Date    CHOL 133 11/02/2024    TRIG 121 11/02/2024    HDL 32 11/02/2024     Lab Results   Component Value Date    LDL 77 11/02/2024     Lab Results   Component Value Date     11/02/2024    K 4.4 11/02/2024     11/02/2024    CO2 28 11/02/2024    BUN 13 11/02/2024    CREATININE 1.1 11/02/2024    GLUCOSE 186 (H) 11/02/2024    CALCIUM 9.2 11/02/2024    BILITOT 0.4 11/02/2024    ALKPHOS 103 11/02/2024    AST 33 11/02/2024    ALT 53 11/02/2024    LABGLOM 86 11/02/2024                 There is no immunization history on file for this patient.      Health Maintenance   Topic Date Due    HIV screen  Never done    Diabetic retinal exam  Never done    Hepatitis B vaccine (1 of 3 - 19+ 3-dose series) Never done    DTaP/Tdap/Td vaccine (1 - Tdap) Never done    Pneumococcal 0-49 years Vaccine (1 of 2 - PCV) Never done    Flu vaccine

## 2025-07-07 ENCOUNTER — RESULTS FOLLOW-UP (OUTPATIENT)
Dept: FAMILY MEDICINE CLINIC | Age: 45
End: 2025-07-07

## 2025-07-07 ENCOUNTER — HOSPITAL ENCOUNTER (OUTPATIENT)
Age: 45
Discharge: HOME | End: 2025-07-07
Payer: COMMERCIAL

## 2025-07-07 DIAGNOSIS — E11.65 TYPE 2 DIABETES MELLITUS WITH HYPERGLYCEMIA, WITHOUT LONG-TERM CURRENT USE OF INSULIN (HCC): ICD-10-CM

## 2025-07-07 LAB
DEPRECATED MEAN GLUCOSE BLD GHB EST-ACNC: 159 MG/DL (ref 70–126)
HBA1C MFR BLD HPLC: 7.3 % (ref 4–6)

## 2025-07-07 PROCEDURE — 36415 COLL VENOUS BLD VENIPUNCTURE: CPT

## 2025-07-07 PROCEDURE — 83036 HEMOGLOBIN GLYCOSYLATED A1C: CPT

## 2025-07-08 ENCOUNTER — TELEPHONE (OUTPATIENT)
Dept: FAMILY MEDICINE CLINIC | Age: 45
End: 2025-07-08

## 2025-07-08 NOTE — TELEPHONE ENCOUNTER
Liana Hoffmann MD sent to [facility] Clinical Staff  Please notify Jun that his hemoglobin A1c is improved to 7.3%.  Goal is less than 7%.  Increase metformin XR to 500 mg 2 p.o. twice daily.  Follow-up with me in 3 months with hemoglobin A1c at the visit.  CG